# Patient Record
Sex: MALE | Race: WHITE | NOT HISPANIC OR LATINO | Employment: OTHER | URBAN - METROPOLITAN AREA
[De-identification: names, ages, dates, MRNs, and addresses within clinical notes are randomized per-mention and may not be internally consistent; named-entity substitution may affect disease eponyms.]

---

## 2024-09-09 ENCOUNTER — ANTICOAGULATION VISIT (OUTPATIENT)
Dept: FAMILY MEDICINE CLINIC | Facility: CLINIC | Age: 89
End: 2024-09-09

## 2024-09-09 ENCOUNTER — OFFICE VISIT (OUTPATIENT)
Dept: FAMILY MEDICINE CLINIC | Facility: CLINIC | Age: 89
End: 2024-09-09
Payer: MEDICARE

## 2024-09-09 VITALS
TEMPERATURE: 97.8 F | OXYGEN SATURATION: 96 % | BODY MASS INDEX: 29.35 KG/M2 | SYSTOLIC BLOOD PRESSURE: 136 MMHG | DIASTOLIC BLOOD PRESSURE: 80 MMHG | HEART RATE: 73 BPM | RESPIRATION RATE: 16 BRPM | WEIGHT: 187 LBS | HEIGHT: 67 IN

## 2024-09-09 DIAGNOSIS — Z95.1 HX OF CABG: ICD-10-CM

## 2024-09-09 DIAGNOSIS — E53.8 VITAMIN B12 DEFICIENCY: ICD-10-CM

## 2024-09-09 DIAGNOSIS — E55.9 VITAMIN D DEFICIENCY: ICD-10-CM

## 2024-09-09 DIAGNOSIS — Z12.5 SCREENING FOR PROSTATE CANCER: ICD-10-CM

## 2024-09-09 DIAGNOSIS — Z95.0 PACEMAKER: ICD-10-CM

## 2024-09-09 DIAGNOSIS — Z95.1 HX OF CABG: Primary | ICD-10-CM

## 2024-09-09 DIAGNOSIS — E78.2 MIXED HYPERLIPIDEMIA: Primary | ICD-10-CM

## 2024-09-09 LAB — INR PPP: 1.6 (ref 2–3)

## 2024-09-09 PROCEDURE — 99204 OFFICE O/P NEW MOD 45 MIN: CPT | Performed by: NURSE PRACTITIONER

## 2024-09-09 PROCEDURE — 36416 COLLJ CAPILLARY BLOOD SPEC: CPT | Performed by: NURSE PRACTITIONER

## 2024-09-09 PROCEDURE — 85610 PROTHROMBIN TIME: CPT | Performed by: NURSE PRACTITIONER

## 2024-09-09 PROCEDURE — 1159F MED LIST DOCD IN RCRD: CPT | Performed by: NURSE PRACTITIONER

## 2024-09-09 PROCEDURE — 1160F RVW MEDS BY RX/DR IN RCRD: CPT | Performed by: NURSE PRACTITIONER

## 2024-09-09 RX ORDER — LANOLIN ALCOHOL/MO/W.PET/CERES
2500 CREAM (GRAM) TOPICAL DAILY
COMMUNITY

## 2024-09-09 RX ORDER — SIMVASTATIN 40 MG
40 TABLET ORAL NIGHTLY
COMMUNITY

## 2024-09-09 RX ORDER — TAMSULOSIN HYDROCHLORIDE 0.4 MG/1
1 CAPSULE ORAL DAILY
COMMUNITY

## 2024-09-09 RX ORDER — SOTALOL HYDROCHLORIDE 80 MG/1
80 TABLET ORAL 2 TIMES DAILY
COMMUNITY

## 2024-09-09 RX ORDER — WARFARIN SODIUM 5 MG/1
5 TABLET ORAL
COMMUNITY

## 2024-09-09 RX ORDER — FUROSEMIDE 20 MG
20 TABLET ORAL 2 TIMES DAILY
COMMUNITY

## 2024-09-09 RX ORDER — ASPIRIN 81 MG/1
81 TABLET ORAL DAILY
COMMUNITY

## 2024-09-09 NOTE — PROGRESS NOTES
Subjective   Yuliana Tate is a 89 y.o. male.     History of Present Illness   Chief Complaint     Establish Care ([facility] [place] [phone])  REFERRAL FOR CARDIOLOGY ([facility] ([place]))    Yuliana Tate 89 y.o. male who presents today for a new patient appointment.    he has a history of   Patient Active Problem List   Diagnosis    Hx of CABG    Pacemaker   .  he is here to establish care I reviewed the PFSH recorded today by my MA/LPN staff.   he   He has been feeling well.    Patient recently moved here to live with his son and daughter-in-law due to his wife passing.  Patient was seeing primary care and cardiology.  Attempted to pull over records from the internal medicine and cardiology group that he saw but unable to access those records through Care Everywhere.  Having patient's sign release of information so that we can request the records via fax.  He is on warfarin which she states is because of the CABG x 3 and pacemaker.  He last had his INR checked a couple of weeks ago.  States it was normal a couple of weeks ago but had been abnormal a few days prior.  He is due for recheck.  He also needs a referral to a local cardiologist.  He does not need any medications refilled at this time.  He was taking an over-the-counter B12 supplement that his primary care told him to take but is unsure if he actually had deficiency.  He is due for labs as it has been about 6 months since he had any.  He is not fasting today.    He is unsure of the date of his last colonoscopy but states it was normal.  Denies family history of colon or prostate cancer.    The following portions of the patient's history were reviewed and updated as appropriate: allergies, current medications, past family history, past medical history, past social history, past surgical history, and problem list.    Review of Systems   Respiratory:   Negative for cough and shortness of breath.    Cardiovascular:  Negative for chest pain and palpitations.   Skin:  Negative for rash.   Psychiatric/Behavioral:  Negative for dysphoric mood and sleep disturbance. The patient is not nervous/anxious.        Objective   Physical Exam  Vitals and nursing note reviewed.   Constitutional:       Appearance: Normal appearance. He is well-developed.   Neck:      Vascular: No carotid bruit.   Cardiovascular:      Rate and Rhythm: Normal rate and regular rhythm.   Pulmonary:      Effort: Pulmonary effort is normal.      Breath sounds: Normal breath sounds.   Neurological:      Mental Status: He is alert and oriented to person, place, and time.   Psychiatric:         Mood and Affect: Mood normal.         Behavior: Behavior normal.         Thought Content: Thought content normal.         Judgment: Judgment normal.         Assessment & Plan   Diagnoses and all orders for this visit:    1. Mixed hyperlipidemia (Primary)  -     Cancel: Comprehensive metabolic panel  -     Cancel: Lipid panel  -     Cancel: CBC and Differential  -     Cancel: TSH  -     Cancel: PSA Screen  -     CBC and Differential  -     Comprehensive metabolic panel  -     Lipid panel  -     PSA Screen  -     TSH    2. Screening for prostate cancer  -     Cancel: PSA Screen  -     PSA Screen    3. Hx of CABG  -     Ambulatory Referral to Cardiology    4. Pacemaker  -     Ambulatory Referral to Cardiology    5. Vitamin B12 deficiency  -     Vitamin B12    6. Vitamin D deficiency  -     Vitamin D 25 hydroxy          Referral placed to cardiology.  Records requested.  INR checked today and was 1.6.  He takes 2.5 mg daily but missed a dose the other day.  He will take 5 mg today and then resume regular dosing. Recheck INR in 1 week.  Patient and family will schedule appointment for labs and nurse appt in 1 week for INR check.

## 2024-09-13 ENCOUNTER — PATIENT ROUNDING (BHMG ONLY) (OUTPATIENT)
Dept: FAMILY MEDICINE CLINIC | Facility: CLINIC | Age: 89
End: 2024-09-13
Payer: MEDICARE

## 2024-09-13 NOTE — PROGRESS NOTES
A My-Chart message has been sent to the patient for PATIENT ROUNDING with [facility]

## 2024-09-17 ENCOUNTER — ANTICOAGULATION VISIT (OUTPATIENT)
Dept: FAMILY MEDICINE CLINIC | Facility: CLINIC | Age: 89
End: 2024-09-17
Payer: MEDICARE

## 2024-09-17 DIAGNOSIS — Z95.1 HX OF CABG: Primary | ICD-10-CM

## 2024-09-17 LAB — INR PPP: 2.4 (ref 2–3)

## 2024-09-17 PROCEDURE — 85610 PROTHROMBIN TIME: CPT | Performed by: NURSE PRACTITIONER

## 2024-09-17 PROCEDURE — 36416 COLLJ CAPILLARY BLOOD SPEC: CPT | Performed by: NURSE PRACTITIONER

## 2024-09-18 LAB
25(OH)D3+25(OH)D2 SERPL-MCNC: 38.2 NG/ML (ref 30–100)
ALBUMIN SERPL-MCNC: 4 G/DL (ref 3.5–5.2)
ALBUMIN/GLOB SERPL: 1.9 G/DL
ALP SERPL-CCNC: 74 U/L (ref 39–117)
ALT SERPL-CCNC: 9 U/L (ref 1–41)
AST SERPL-CCNC: 14 U/L (ref 1–40)
BASOPHILS # BLD AUTO: 0.04 10*3/MM3 (ref 0–0.2)
BASOPHILS NFR BLD AUTO: 0.8 % (ref 0–1.5)
BILIRUB SERPL-MCNC: 0.7 MG/DL (ref 0–1.2)
BUN SERPL-MCNC: 10 MG/DL (ref 8–23)
BUN/CREAT SERPL: 8.1 (ref 7–25)
CALCIUM SERPL-MCNC: 9.4 MG/DL (ref 8.6–10.5)
CHLORIDE SERPL-SCNC: 105 MMOL/L (ref 98–107)
CHOLEST SERPL-MCNC: 144 MG/DL (ref 0–200)
CO2 SERPL-SCNC: 30.5 MMOL/L (ref 22–29)
CREAT SERPL-MCNC: 1.24 MG/DL (ref 0.76–1.27)
EGFRCR SERPLBLD CKD-EPI 2021: 55.9 ML/MIN/1.73
EOSINOPHIL # BLD AUTO: 0.15 10*3/MM3 (ref 0–0.4)
EOSINOPHIL NFR BLD AUTO: 2.9 % (ref 0.3–6.2)
ERYTHROCYTE [DISTWIDTH] IN BLOOD BY AUTOMATED COUNT: 12.6 % (ref 12.3–15.4)
GLOBULIN SER CALC-MCNC: 2.1 GM/DL
GLUCOSE SERPL-MCNC: 97 MG/DL (ref 65–99)
HCT VFR BLD AUTO: 40.1 % (ref 37.5–51)
HDLC SERPL-MCNC: 45 MG/DL (ref 40–60)
HGB BLD-MCNC: 13.2 G/DL (ref 13–17.7)
IMM GRANULOCYTES # BLD AUTO: 0.01 10*3/MM3 (ref 0–0.05)
IMM GRANULOCYTES NFR BLD AUTO: 0.2 % (ref 0–0.5)
LDLC SERPL CALC-MCNC: 83 MG/DL (ref 0–100)
LYMPHOCYTES # BLD AUTO: 1.31 10*3/MM3 (ref 0.7–3.1)
LYMPHOCYTES NFR BLD AUTO: 25 % (ref 19.6–45.3)
MCH RBC QN AUTO: 29.9 PG (ref 26.6–33)
MCHC RBC AUTO-ENTMCNC: 32.9 G/DL (ref 31.5–35.7)
MCV RBC AUTO: 90.7 FL (ref 79–97)
MONOCYTES # BLD AUTO: 0.37 10*3/MM3 (ref 0.1–0.9)
MONOCYTES NFR BLD AUTO: 7.1 % (ref 5–12)
NEUTROPHILS # BLD AUTO: 3.35 10*3/MM3 (ref 1.7–7)
NEUTROPHILS NFR BLD AUTO: 64 % (ref 42.7–76)
NRBC BLD AUTO-RTO: 0 /100 WBC (ref 0–0.2)
PLATELET # BLD AUTO: 149 10*3/MM3 (ref 140–450)
POTASSIUM SERPL-SCNC: 4.6 MMOL/L (ref 3.5–5.2)
PROT SERPL-MCNC: 6.1 G/DL (ref 6–8.5)
PSA SERPL-MCNC: 0.91 NG/ML (ref 0–4)
RBC # BLD AUTO: 4.42 10*6/MM3 (ref 4.14–5.8)
SODIUM SERPL-SCNC: 142 MMOL/L (ref 136–145)
TRIGL SERPL-MCNC: 86 MG/DL (ref 0–150)
TSH SERPL DL<=0.005 MIU/L-ACNC: 2.74 UIU/ML (ref 0.27–4.2)
VIT B12 SERPL-MCNC: >2000 PG/ML (ref 211–946)
VLDLC SERPL CALC-MCNC: 16 MG/DL (ref 5–40)
WBC # BLD AUTO: 5.23 10*3/MM3 (ref 3.4–10.8)

## 2024-09-30 ENCOUNTER — TELEPHONE (OUTPATIENT)
Dept: CARDIOLOGY | Facility: CLINIC | Age: 89
End: 2024-09-30
Payer: MEDICARE

## 2024-09-30 ENCOUNTER — TELEPHONE (OUTPATIENT)
Dept: PHARMACY | Facility: HOSPITAL | Age: 89
End: 2024-09-30
Payer: MEDICARE

## 2024-09-30 ENCOUNTER — ANTICOAGULATION VISIT (OUTPATIENT)
Dept: PHARMACY | Facility: HOSPITAL | Age: 89
End: 2024-09-30
Payer: MEDICARE

## 2024-09-30 DIAGNOSIS — I48.91 ATRIAL FIBRILLATION, UNSPECIFIED TYPE: Primary | ICD-10-CM

## 2024-09-30 NOTE — PROGRESS NOTES
This visit is for documentation purposes only: Current anticoagulation episode resolved to allow for new referral by Cardiology to Medication Management Clinic.

## 2024-09-30 NOTE — TELEPHONE ENCOUNTER
Received referral for warfarin management. Spoke with Mr. Tate's son, Curly. He is agreeable to INR check in clinic on 10/8/24 (same day as his appointment with Dr. Alexander). Per son's report, Mr. Tate has been prescribed warfarin for ~20 years. He recently moved here from [place] to be near his son. Unfortunately, his wife passed away in May of this year.

## 2024-09-30 NOTE — TELEPHONE ENCOUNTER
Good morning,     I have resolved the existing anticoagulation episode on file to allow for a new anticoagulation referral from Dr. Alexander. As soon as we receive Dr. Alexander' referral, we will be happy to contact Mr. Tate and schedule him for his first visit with our clinic for warfarin monitoring/management.     Please call us if you have any questions:  [phone].     Thank you!  Charanjit

## 2024-09-30 NOTE — TELEPHONE ENCOUNTER
Caller: LISA    Relationship: SON    Best call back number: [phone]        What was the call regarding:  LISA WAS CALLING IN TO ASK WHAT THE PROCESS WAS  FOR THE MEDICATION MANAGEMENT CLINIC FOR PT'S INR- THEY ARE CURRENTLY HAVING IT MANAGED IN [facility] BUT WOULD LIKE TO SWITCH OVER TO THE HOSPITAL    ALSO MEDICAL RECS MAYBE AT:     [facility]     DR. KNOX    [phone]  FAX: [phone]    ANY ANTOINETTE FORMS CAN BE SENT TO :    [email or web address]

## 2024-10-08 ENCOUNTER — OFFICE VISIT (OUTPATIENT)
Dept: CARDIOLOGY | Facility: CLINIC | Age: 89
End: 2024-10-08
Payer: MEDICARE

## 2024-10-08 ENCOUNTER — ANTICOAGULATION VISIT (OUTPATIENT)
Dept: PHARMACY | Facility: HOSPITAL | Age: 89
End: 2024-10-08
Payer: MEDICARE

## 2024-10-08 VITALS
HEART RATE: 80 BPM | OXYGEN SATURATION: 95 % | DIASTOLIC BLOOD PRESSURE: 80 MMHG | SYSTOLIC BLOOD PRESSURE: 130 MMHG | HEIGHT: 67 IN | WEIGHT: 184.8 LBS | BODY MASS INDEX: 29 KG/M2

## 2024-10-08 DIAGNOSIS — Z95.1 HX OF CABG: ICD-10-CM

## 2024-10-08 DIAGNOSIS — I49.5 SSS (SICK SINUS SYNDROME): ICD-10-CM

## 2024-10-08 DIAGNOSIS — I48.91 ATRIAL FIBRILLATION, UNSPECIFIED TYPE: Primary | ICD-10-CM

## 2024-10-08 DIAGNOSIS — E78.2 MIXED HYPERLIPIDEMIA: ICD-10-CM

## 2024-10-08 DIAGNOSIS — I25.810 CORONARY ARTERY DISEASE INVOLVING CORONARY BYPASS GRAFT OF NATIVE HEART WITHOUT ANGINA PECTORIS: Primary | ICD-10-CM

## 2024-10-08 DIAGNOSIS — Z95.0 PACEMAKER: ICD-10-CM

## 2024-10-08 DIAGNOSIS — I34.0 NONRHEUMATIC MITRAL VALVE REGURGITATION: ICD-10-CM

## 2024-10-08 DIAGNOSIS — I48.0 PAF (PAROXYSMAL ATRIAL FIBRILLATION): ICD-10-CM

## 2024-10-08 LAB
INR PPP: 2.1 (ref 0.91–1.09)
PROTHROMBIN TIME: 25.1 SECONDS (ref 10–13.8)

## 2024-10-08 PROCEDURE — 85610 PROTHROMBIN TIME: CPT

## 2024-10-08 PROCEDURE — 99204 OFFICE O/P NEW MOD 45 MIN: CPT | Performed by: INTERNAL MEDICINE

## 2024-10-08 PROCEDURE — 36416 COLLJ CAPILLARY BLOOD SPEC: CPT

## 2024-10-08 PROCEDURE — 93000 ELECTROCARDIOGRAM COMPLETE: CPT | Performed by: INTERNAL MEDICINE

## 2024-10-08 PROCEDURE — G0463 HOSPITAL OUTPT CLINIC VISIT: HCPCS

## 2024-10-08 NOTE — PROGRESS NOTES
Subjective:     Encounter Date:10/08/24      Patient ID: DAVE Tate is a 89 y.o. male.    Chief Complaint:  History of Present Illness    Dear Sabrina,    I had the pleasure of seeing this patient in the office today for initial evaluation and consultation.  I appreciate that you sent him in to see us.  They come in today to be seen for history of coronary disease, prior bypass, sick sinus syndrome, status post pacemaker placement, paroxysmal atrial fibrillation on chronic anticoagulation with warfarin, and mixed hyperlipidemia.    Patient previously lived in [place] and is now moved to [place] to be with his family.  His wife passed away at the start of 2024.    Patient has a history of CAD, status post coronary bypass graft in 2008.  He has a history of sick sinus syndrome with paroxysmal atrial fibrillation and he has a pacemaker in place.  He has been on chronic anticoagulation with warfarin, also on antiarrhythmic therapy with sotalol.  He has a history of hypertension and hyperlipidemia.    He denies any particular cardiac complaints.  No chest pain or chest discomfort.  No shortness of breath.  No lower extremity edema.  Denies any orthopnea or PND.    2008, April 2008, he had a coronary bypass grafting with sequential mammary to the diagonal and LAD, and also an SVG to the obtuse marginal.  He had a cardiac catheterization performed in 2011 that showed no significant disease per their report.  His pacemaker placed with an A-fib ablation in 2008 and his most recent generator change was April 2017.    He remains on antiarrhythmic therapy for his atrial fibrillation and he has not felt any breakthrough A-fib.    Most recent stress test appears to been performed December 2022.  Patient had a normal pharmacologic stress test with myocardial perfusion imaging with no evidence of ischemia and ejection fraction 72%.  He had an echocardiogram performed at the same time, ejection fraction on that was 56% with  "grade 1 diastolic dysfunction mild to moderate mitral regurgitation no other significant abnormality.    The following portions of the patient's history were reviewed and updated as appropriate: allergies, current medications, past family history, past medical history, past social history, past surgical history and problem list.      ECG 12 Lead    Date/Time: 10/8/2024 12:35 PM  Performed by: Juan Francisco Alexander III, MD    Authorized by: Juan Francisco Alexander III, MD  Comparison: compared with previous ECG   Similar to previous ECG  Rhythm: sinus rhythm  Rate: normal  Conduction: conduction normal  ST Segments: ST segments normal  T Waves: T waves normal  QRS axis: normal  Other: no other findings    Clinical impression: normal ECG             Objective:     Vitals:    10/08/24 0933   BP: 130/80   BP Location: Left arm   Patient Position: Sitting   Pulse: 80   SpO2: 95%   Weight: 83.8 kg (184 lb 12.8 oz)   Height: 170.2 cm (67\")     Body mass index is 28.94 kg/m².      Vitals reviewed.   Constitutional:       General: Not in acute distress.     Appearance: Well-developed. Not diaphoretic.   Eyes:      General:         Right eye: No discharge.         Left eye: No discharge.      Conjunctiva/sclera: Conjunctivae normal.   HENT:      Head: Normocephalic and atraumatic.      Nose: Nose normal.   Neck:      Thyroid: No thyromegaly.      Trachea: No tracheal deviation.   Pulmonary:      Effort: Pulmonary effort is normal. No respiratory distress.      Breath sounds: Normal breath sounds. No stridor.   Chest:      Chest wall: Not tender to palpatation.   Cardiovascular:      Normal rate. Regular rhythm.      Murmurs: There is a grade 1/6 high frequency blowing holosystolic murmur at the apex.      . No S3 gallop. No click. No rub.   Pulses:     Intact distal pulses.   Edema:     Peripheral edema absent.   Abdominal:      General: Bowel sounds are normal. There is no distension.      Palpations: Abdomen is soft. There is no abdominal " "mass.   Musculoskeletal: Normal range of motion.         General: No tenderness or deformity.      Cervical back: Normal range of motion and neck supple. Skin:     General: Skin is warm and dry.      Findings: No erythema or rash.   Neurological:      Mental Status: Alert.   Psychiatric:         Attention and Perception: Attention normal.         Data and records reviewed:     Lab Results   Component Value Date    GLUCOSE 97 09/17/2024    BUN 10 09/17/2024    CREATININE 1.24 09/17/2024     09/17/2024    K 4.6 09/17/2024     09/17/2024    CALCIUM 9.4 09/17/2024    ALBUMIN 4.0 09/17/2024    ALT 9 09/17/2024    AST 14 09/17/2024    ALKPHOS 74 09/17/2024    BILITOT 0.7 09/17/2024    BCR 8.1 09/17/2024     No results found for: \"CHOL\"  Lab Results   Component Value Date    TRIG 86 09/17/2024     Lab Results   Component Value Date    HDL 45 09/17/2024     Lab Results   Component Value Date    LDL 83 09/17/2024     Lab Results   Component Value Date    VLDL 16 09/17/2024     No results found for: \"LDLHDL\"  CBC          9/17/2024    10:29   CBC   WBC 5.23    RBC 4.42    Hemoglobin 13.2    Hematocrit 40.1    MCV 90.7    MCH 29.9    MCHC 32.9    RDW 12.6    Platelets 149      No radiology results for the last 90 days.          Assessment:          Diagnosis Plan   1. Coronary artery disease involving coronary bypass graft of native heart without angina pectoris  ECG 12 Lead      2. Hx of CABG  ECG 12 Lead      3. PAF (paroxysmal atrial fibrillation)  ECG 12 Lead      4. Pacemaker  ECG 12 Lead      5. SSS (sick sinus syndrome)  ECG 12 Lead      6. Mixed hyperlipidemia  ECG 12 Lead      7. Nonrheumatic mitral valve regurgitation  ECG 12 Lead             Plan:       1.  CAD, status post CABG x 3 as outlined above, no angina pectoris, continue current medical therapy  2.  Paroxysmal atrial fibrillation in sinus rhythm, remains on sotalol 80 mg twice daily  3.  Chronic anticoagulation on warfarin, he says they talk to " him previously but NOAC therapy but he just does not want to change, I reviewed that again with he and his son today  4.  Sick sinus syndrome, status post pacemaker in place, looks like the device was replaced in 2017.  I do not yet have that procedure note.  We will get him enrolled in our device clinic  5.  Hypertension, excellent control, continue same  6.  Mixed hyperlipidemia on lipid-lowering therapy with simvastatin, continue same.  7.  Mitral regurgitation-mild to moderate on most recent echocardiogram in 2022, continue to follow.    Thank you very much for allowing us to participate in the care of this pleasant patient.  Please don't hesitate to call if I can be of assistance in any way.      Current Outpatient Medications:     aspirin 81 MG EC tablet, Take 1 tablet by mouth Daily., Disp: , Rfl:     furosemide (LASIX) 20 MG tablet, Take 1 tablet by mouth 2 (Two) Times a Day., Disp: , Rfl:     simvastatin (ZOCOR) 40 MG tablet, Take 1 tablet by mouth Every Night., Disp: , Rfl:     sotalol (BETAPACE) 80 MG tablet, Take 1 tablet by mouth 2 (Two) Times a Day., Disp: , Rfl:     tamsulosin (FLOMAX) 0.4 MG capsule 24 hr capsule, Take 1 capsule by mouth Daily., Disp: , Rfl:     warfarin (COUMADIN) 5 MG tablet, Take 1 tablet by mouth Daily. HAS 5MG ON HAND AND CUTS IN 1/2 DUE TO COST LAST INR WAS 2.4 AND WAS REDUCED TO 2.5, Disp: , Rfl:     vitamin B-12 (CYANOCOBALAMIN) 1000 MCG tablet, Take 2.5 tablets by mouth Daily. (Patient not taking: Reported on 10/8/2024), Disp: , Rfl:          Return in about 1 year (around 10/8/2025).

## 2024-10-08 NOTE — PROGRESS NOTES
Anticoagulation Clinic Progress Note  Anticoagulation Summary  As of 10/8/2024      INR goal:  2.0-3.0   TTR:  --   INR used for dosin.1 (10/8/2024)   Warfarin maintenance plan:  2.5 mg every day   Weekly warfarin total:  17.5 mg   Plan last modified:  Charanjit Horowitz, PharmD (10/8/2024)   Next INR check:  10/23/2024   Target end date:      Indications    Atrial fibrillation  unspecified type [I48.91]                 Anticoagulation Episode Summary       INR check location:      Preferred lab:      Send INR reminders to:   [facility] [place]    Comments:  1st visit 10/8/24          Anticoagulation Care Providers       Provider Role Specialty Phone number    Juan Francisco Alexander III, MD Referring Cardiology [phone]            Clinic Interview:  Patient Findings     Positives:  Missed doses, Other complaints    Negatives:  Signs/symptoms of thrombosis, Signs/symptoms of bleeding,   Laboratory test error suspected, Change in health, Change in alcohol use,   Change in activity, Upcoming invasive procedure, Emergency department   visit, Upcoming dental procedure, Extra doses, Change in medications,   Change in diet/appetite, Hospital admission, Bruising    Comments:  Reports he has been prescribed warfarin ~20 years. He reports   having 5-mg tablets, and he reports dose of 2.5 mg daily for quite some   time. Although, he indicates he recalls his INR occasionally becoming   supratherapeutic, so he has occasionally held his warfarin a day to try to   correct it himself. He held his warfarin yesterday because he was worried   his INR would be supratherapeutic. He reports history of hemorrhoids,   resulting in occasional hemorrhoidal bleeding.      Clinical Outcomes     Negatives:  Major bleeding event, Thromboembolic event,   Anticoagulation-related hospital admission, Anticoagulation-related ED   visit, Anticoagulation-related fatality    Comments:  Reports he has been prescribed warfarin ~20 years. He reports    having 5-mg tablets, and he reports dose of 2.5 mg daily for quite some   time. Although, he indicates he recalls his INR occasionally becoming   supratherapeutic, so he has occasionally held his warfarin a day to try to   correct it himself. He held his warfarin yesterday because he was worried   his INR would be supratherapeutic. He reports history of hemorrhoids,   resulting in occasional hemorrhoidal bleeding.        Education:  Yuliana Tate is a new start in the Medication Management Clinic. We discussed the followin) Warfarin's indication, mechanism, and dosing  2) Enforced the importance of taking warfarin as instructed and at the same time every day, preferably in the evening so that we can make dose adjustments more easily following subsequent clinic visits  3) What he should do about a missed dose; pts can take missed doses within about 12 hours of their usual scheduled dose, but he was instructed on the importance of not doubling up on doses unless told to do so by the Medication Management Clinic  4) Explained possible side effects of warfarin therapy, including increased risk of bleeding, s/sx of bleeding and s/sx of any additional clots/PE/CVA.   5) Discussed monitoring of warfarin, the INR, goal INR range, and the frequency of monitoring  6) Reviewed drug/food/tobacco/EtOH interactions and provided written information covering these topics in more detail, explaining that green, leafy vegetables interact most heavily with warfarin  7) Instructed the pt not to take or discontinue any medications without informing his physician/pharmacist and reminded him to inform us of any dietary changes, as well  8) Explained that he would be coming into the clinic more frequently in these first few weeks of therapy as we try to adjust his dose and achieve a therapeutic INR x 2 consecutive readings. Once that is achieved, patient will follow up in clinic every 4 weeks, on average.    He stated no problems  with transportation or scheduling clinic appts in this manner. he expressed understanding of the information provided and has no additional questions at this time.    Yuliana Tate was presented with a copy of the Patients Rights and Responsibilities. he expressed verbal consent and agreement to receive care in the Medication Management Clinic under the current collaborative care agreement with [facility].       INR History:      9/9/2024    11:38 AM 9/17/2024    10:00 AM 9/30/2024    11:08 AM 10/8/2024     8:30 AM   Anticoagulation Monitoring   INR 1.60 2.40  2.1   INR Date 9/9/2024 9/17/2024  10/8/2024   INR Goal 2.0-3.0 2.0-3.0 2.0-3.0 2.0-3.0   Trend  Same     Last Week Total 0 mg 35 mg  15 mg   Next Week Total 35 mg 35 mg  17.5 mg   Sun 5 mg 5 mg  2.5 mg   Mon 5 mg 5 mg  2.5 mg   Tue 5 mg 5 mg  2.5 mg   Wed 5 mg 5 mg  2.5 mg   Thu 5 mg 5 mg  2.5 mg   Fri 5 mg 5 mg  2.5 mg   Sat 5 mg 5 mg  2.5 mg   Visit Report Report          Plan:  1. INR is Therapeutic today- see above in Anticoagulation Summary.   Will instruct Yuliana Tate to Continue their warfarin regimen at dose of 2.5 mg daily - see above in Anticoagulation Summary.  2. Follow up in 2 weeks to ensure stable.   3. Patient declines warfarin refills.  4. Verbal and written information provided. Patient expresses understanding and has no further questions at this time.    Charanjit Horowitz, PharmD

## 2024-10-14 ENCOUNTER — TELEPHONE (OUTPATIENT)
Dept: CARDIOLOGY | Facility: CLINIC | Age: 89
End: 2024-10-14
Payer: MEDICARE

## 2024-10-14 NOTE — TELEPHONE ENCOUNTER
Caller: michelle hancock    Relationship: Emergency Contact    Best call back number: [phone]    What is the medical concern/diagnosis: HAS PACEMAKER    What specialty or service is being requested: ELECTROPHISOLOGY    What is the provider, practice or medical service name: [facility]    What is the office location: [address] [place] [address] 4TH FLOOR    What is the office phone number:     Any additional details: PT'S SON WAS TOLD THAT A REFERRAL TO SEE AN EP WAS PUT IN FOR HIS DAD. NO REFERRAL IN CHART. PLEASE CALL TO SCHEDULE APPT.

## 2024-10-24 ENCOUNTER — ANTICOAGULATION VISIT (OUTPATIENT)
Dept: PHARMACY | Facility: HOSPITAL | Age: 89
End: 2024-10-24
Payer: MEDICARE

## 2024-10-24 DIAGNOSIS — I48.0 PAF (PAROXYSMAL ATRIAL FIBRILLATION): Primary | ICD-10-CM

## 2024-10-24 LAB
INR PPP: 2.6 (ref 0.91–1.09)
PROTHROMBIN TIME: 31.5 SECONDS (ref 10–13.8)

## 2024-10-24 PROCEDURE — 36416 COLLJ CAPILLARY BLOOD SPEC: CPT

## 2024-10-24 PROCEDURE — G0463 HOSPITAL OUTPT CLINIC VISIT: HCPCS

## 2024-10-24 PROCEDURE — 85610 PROTHROMBIN TIME: CPT

## 2024-10-24 NOTE — PROGRESS NOTES
Anticoagulation Clinic Progress Note    Anticoagulation Summary  As of 10/24/2024      INR goal:  2.0-3.0   TTR:  100.0% (2 wk)   INR used for dosin.6 (10/24/2024)   Warfarin maintenance plan:  2.5 mg every day   Weekly warfarin total:  17.5 mg   No change documented:  Charanjit Horowitz PharmD   Plan last modified:  Charanjit Horowitz PharmD (10/8/2024)   Next INR check:  2024   Target end date:  --    Indications    PAF (paroxysmal atrial fibrillation) [I48.0]                 Anticoagulation Episode Summary       INR check location:  --    Preferred lab:  --    Send INR reminders to:   [facility]    Comments:  1st visit 10/8/24          Anticoagulation Care Providers       Provider Role Specialty Phone number    Juan Francisco Alexander III, MD Referring Cardiology [phone]            Clinic Interview:  Patient Findings     Negatives:  Signs/symptoms of thrombosis, Signs/symptoms of bleeding,   Laboratory test error suspected, Change in health, Change in alcohol use,   Change in activity, Upcoming invasive procedure, Emergency department   visit, Upcoming dental procedure, Missed doses, Extra doses, Change in   medications, Change in diet/appetite, Hospital admission, Bruising, Other   complaints      Clinical Outcomes     Negatives:  Major bleeding event, Thromboembolic event,   Anticoagulation-related hospital admission, Anticoagulation-related ED   visit, Anticoagulation-related fatality        INR History:      2024    11:38 AM 2024    10:00 AM 2024    11:08 AM 10/8/2024     8:30 AM 10/24/2024     2:00 PM   Anticoagulation Monitoring   INR 1.60 2.40 -- 2.1 2.6   INR Date 2024 2024  10/8/2024 10/24/2024   INR Goal 2.0-3.0 2.0-3.0 2.0-3.0 2.0-3.0 2.0-3.0   Trend  Same   Same   Last Week Total 0 mg 35 mg  15 mg 17.5 mg   Next Week Total 35 mg 35 mg  17.5 mg 17.5 mg   Sun 5 mg 5 mg  2.5 mg 2.5 mg   Mon 5 mg 5 mg  2.5 mg 2.5 mg   Tue 5 mg 5 mg  2.5 mg 2.5 mg   Wed 5 mg 5 mg  2.5  mg 2.5 mg   Thu 5 mg 5 mg  2.5 mg 2.5 mg   Fri 5 mg 5 mg  2.5 mg 2.5 mg   Sat 5 mg 5 mg  2.5 mg 2.5 mg   Visit Report Report   Report        Plan:  1. INR is Therapeutic today- see above in Anticoagulation Summary.  Will instruct DAVE Tate to Continue their warfarin regimen- see above in Anticoagulation Summary.  2. Follow up in 4 weeks  3. Patient declines warfarin refills.  4. Verbal and written information provided. Patient expresses understanding and has no further questions at this time.    Charanjit Horowitz, PharmD

## 2024-10-30 ENCOUNTER — CLINICAL SUPPORT NO REQUIREMENTS (OUTPATIENT)
Age: 89
End: 2024-10-30
Payer: MEDICARE

## 2024-10-30 DIAGNOSIS — I49.5 SSS (SICK SINUS SYNDROME): Primary | ICD-10-CM

## 2024-10-30 PROCEDURE — 93280 PM DEVICE PROGR EVAL DUAL: CPT | Performed by: STUDENT IN AN ORGANIZED HEALTH CARE EDUCATION/TRAINING PROGRAM

## 2024-11-12 RX ORDER — FUROSEMIDE 20 MG/1
20 TABLET ORAL 2 TIMES DAILY
Qty: 180 TABLET | Refills: 0 | Status: SHIPPED | OUTPATIENT
Start: 2024-11-12

## 2024-11-21 ENCOUNTER — ANTICOAGULATION VISIT (OUTPATIENT)
Dept: PHARMACY | Facility: HOSPITAL | Age: 89
End: 2024-11-21
Payer: MEDICARE

## 2024-11-21 DIAGNOSIS — I48.0 PAF (PAROXYSMAL ATRIAL FIBRILLATION): Primary | ICD-10-CM

## 2024-11-21 LAB
INR PPP: 3.3 (ref 0.91–1.09)
PROTHROMBIN TIME: 40.1 SECONDS (ref 10–13.8)

## 2024-11-21 PROCEDURE — G0463 HOSPITAL OUTPT CLINIC VISIT: HCPCS

## 2024-11-21 PROCEDURE — 36416 COLLJ CAPILLARY BLOOD SPEC: CPT

## 2024-11-21 PROCEDURE — 85610 PROTHROMBIN TIME: CPT

## 2024-11-21 NOTE — PROGRESS NOTES
Anticoagulation Clinic Progress Note    Anticoagulation Summary  As of 11/21/2024      INR goal:  2.0-3.0   TTR:  71.8% (1.4 mo)   INR used for dosing:  3.3 (11/21/2024)   Warfarin maintenance plan:  2.5 mg every day   Weekly warfarin total:  17.5 mg   Plan last modified:  Charanjit Horowitz, PharmD (10/8/2024)   Next INR check:  12/6/2024   Target end date:  --    Indications    PAF (paroxysmal atrial fibrillation) [I48.0]                 Anticoagulation Episode Summary       INR check location:  --    Preferred lab:  --    Send INR reminders to:   [facility]    Comments:  1st visit 10/8/24          Anticoagulation Care Providers       Provider Role Specialty Phone number    Juan Francisco Alexander III, MD Referring Cardiology [phone]            Clinic Interview:  Patient Findings     Negatives:  Signs/symptoms of thrombosis, Signs/symptoms of bleeding,   Laboratory test error suspected, Change in health, Change in alcohol use,   Change in activity, Upcoming invasive procedure, Emergency department   visit, Upcoming dental procedure, Missed doses, Extra doses, Change in   medications, Change in diet/appetite, Hospital admission, Bruising, Other   complaints      Clinical Outcomes     Negatives:  Major bleeding event, Thromboembolic event,   Anticoagulation-related hospital admission, Anticoagulation-related ED   visit, Anticoagulation-related fatality        INR History:      9/9/2024    11:38 AM 9/17/2024    10:00 AM 9/30/2024    11:08 AM 10/8/2024     8:30 AM 10/24/2024     2:00 PM 11/21/2024     2:00 PM   Anticoagulation Monitoring   INR 1.60 2.40 -- 2.1 2.6 3.3   INR Date 9/9/2024 9/17/2024  10/8/2024 10/24/2024 11/21/2024   INR Goal 2.0-3.0 2.0-3.0 2.0-3.0 2.0-3.0 2.0-3.0 2.0-3.0   Trend  Same   Same Same   Last Week Total 0 mg 35 mg  15 mg 17.5 mg 17.5 mg   Next Week Total 35 mg 35 mg  17.5 mg 17.5 mg 15 mg   Sun 5 mg 5 mg  2.5 mg 2.5 mg 2.5 mg   Mon 5 mg 5 mg  2.5 mg 2.5 mg 2.5 mg   Tue 5 mg 5 mg   2.5 mg 2.5 mg 2.5 mg   Wed 5 mg 5 mg  2.5 mg 2.5 mg 2.5 mg   Thu 5 mg 5 mg  2.5 mg 2.5 mg Hold (11/21); Otherwise 2.5 mg   Fri 5 mg 5 mg  2.5 mg 2.5 mg 2.5 mg   Sat 5 mg 5 mg  2.5 mg 2.5 mg 2.5 mg   Visit Report Report   Report         Plan:  1. INR is Supratherapeutic today- see above in Anticoagulation Summary.  Will instruct Luciano Tate to Change their warfarin regimen (HOLD today, then resume 2.5 mg daily) - see above in Anticoagulation Summary.  2. Follow up in 2 weeks  3. Patient declines warfarin refills.  4. Verbal and written information provided. Patient expresses understanding and has no further questions at this time.    Charanjit Horowitz, PharmD

## 2024-12-06 ENCOUNTER — ANTICOAGULATION VISIT (OUTPATIENT)
Dept: PHARMACY | Facility: HOSPITAL | Age: 89
End: 2024-12-06
Payer: MEDICARE

## 2024-12-06 DIAGNOSIS — I48.0 PAF (PAROXYSMAL ATRIAL FIBRILLATION): Primary | ICD-10-CM

## 2024-12-06 LAB
INR PPP: 1.9 (ref 0.91–1.09)
PROTHROMBIN TIME: 23.3 SECONDS (ref 10–13.8)

## 2024-12-06 PROCEDURE — 36416 COLLJ CAPILLARY BLOOD SPEC: CPT

## 2024-12-06 PROCEDURE — 85610 PROTHROMBIN TIME: CPT

## 2024-12-06 PROCEDURE — G0463 HOSPITAL OUTPT CLINIC VISIT: HCPCS

## 2024-12-06 RX ORDER — TAMSULOSIN HYDROCHLORIDE 0.4 MG/1
1 CAPSULE ORAL DAILY
Qty: 30 CAPSULE | Refills: 0 | Status: SHIPPED | OUTPATIENT
Start: 2024-12-06

## 2024-12-06 NOTE — PROGRESS NOTES
Anticoagulation Clinic Progress Note    Anticoagulation Summary  As of 2024      INR goal:  2.0-3.0   TTR:  71.7% (1.9 mo)   INR used for dosin.9 (2024)   Warfarin maintenance plan:  2.5 mg every day   Weekly warfarin total:  17.5 mg   No change documented:  Miguelina Johns, PharmD   Plan last modified:  Charanjit Horowitz PharmD (10/8/2024)   Next INR check:  2024   Target end date:  --    Indications    PAF (paroxysmal atrial fibrillation) [I48.0]                 Anticoagulation Episode Summary       INR check location:  --    Preferred lab:  --    [facility] [place]    Comments:  1st visit 10/8/24          Anticoagulation Care Providers       Provider Role Specialty Phone number    Juan Francisco Alexander III, MD Referring Cardiology [phone]            Clinic Interview:  Patient Findings     Negatives:  Signs/symptoms of thrombosis, Signs/symptoms of bleeding,   Laboratory test error suspected, Change in health, Change in alcohol use,   Change in activity, Upcoming invasive procedure, Emergency department   visit, Upcoming dental procedure, Missed doses, Extra doses, Change in   medications, Change in diet/appetite, Hospital admission, Bruising, Other   complaints      Clinical Outcomes     Negatives:  Major bleeding event, Thromboembolic event,   Anticoagulation-related hospital admission, Anticoagulation-related ED   visit, Anticoagulation-related fatality        INR History:      2024    11:38 AM 2024    10:00 AM 2024    11:08 AM 10/8/2024     8:30 AM 10/24/2024     2:00 PM 2024     2:00 PM 2024     1:30 PM   Anticoagulation Monitoring   INR 1.60 2.40 -- 2.1 2.6 3.3 1.9   INR Date 2024 2024  10/8/2024 10/24/2024 2024 2024   INR Goal 2.0-3.0 2.0-3.0 2.0-3.0 2.0-3.0 2.0-3.0 2.0-3.0 2.0-3.0   Trend  Same   Same Same Same   Last Week Total 0 mg 35 mg  15 mg 17.5 mg 17.5 mg 17.5 mg   Next Week Total 35 mg 35 mg  17.5 mg 17.5  mg 15 mg 17.5 mg   Sun 5 mg 5 mg  2.5 mg 2.5 mg 2.5 mg 2.5 mg   Mon 5 mg 5 mg  2.5 mg 2.5 mg 2.5 mg 2.5 mg   Tue 5 mg 5 mg  2.5 mg 2.5 mg 2.5 mg 2.5 mg   Wed 5 mg 5 mg  2.5 mg 2.5 mg 2.5 mg 2.5 mg   Thu 5 mg 5 mg  2.5 mg 2.5 mg Hold (11/21); Otherwise 2.5 mg 2.5 mg   Fri 5 mg 5 mg  2.5 mg 2.5 mg 2.5 mg 2.5 mg   Sat 5 mg 5 mg  2.5 mg 2.5 mg 2.5 mg 2.5 mg   Visit Report Report   Report          Plan:  1. INR is Therapeutic today- see above in Anticoagulation Summary.  Will instruct Luciano Tate to Continue their warfarin regimen- see above in Anticoagulation Summary.  2. Follow up in 2 weeks  3. Patient declines warfarin refills.  4. Verbal and written information provided. Patient expresses understanding and has no further questions at this time.    Miguelina Johns, PharmD

## 2024-12-10 ENCOUNTER — TELEPHONE (OUTPATIENT)
Dept: CARDIOLOGY | Facility: CLINIC | Age: 89
End: 2024-12-10
Payer: MEDICARE

## 2024-12-10 NOTE — TELEPHONE ENCOUNTER
Called and spoke with patients son to get him scheduled to get his blood pressure checked and comparing it to his blood pressure cuff that he has at home.    Patient states he has been experiencing low blood pressure for roughly a month. He has been experiencing some occ. dizziness.    Explained that I will send a message over to Benjamin to make sure he is aware of this.

## 2024-12-20 ENCOUNTER — OFFICE VISIT (OUTPATIENT)
Dept: CARDIOLOGY | Facility: CLINIC | Age: 89
End: 2024-12-20
Payer: MEDICARE

## 2024-12-20 VITALS
HEIGHT: 67 IN | WEIGHT: 183.6 LBS | OXYGEN SATURATION: 98 % | BODY MASS INDEX: 28.82 KG/M2 | HEART RATE: 69 BPM | DIASTOLIC BLOOD PRESSURE: 78 MMHG | SYSTOLIC BLOOD PRESSURE: 140 MMHG

## 2024-12-20 DIAGNOSIS — E78.2 MIXED HYPERLIPIDEMIA: ICD-10-CM

## 2024-12-20 DIAGNOSIS — I48.0 PAF (PAROXYSMAL ATRIAL FIBRILLATION): ICD-10-CM

## 2024-12-20 DIAGNOSIS — I25.810 CORONARY ARTERY DISEASE INVOLVING CORONARY BYPASS GRAFT OF NATIVE HEART WITHOUT ANGINA PECTORIS: Primary | ICD-10-CM

## 2024-12-20 DIAGNOSIS — I49.5 SSS (SICK SINUS SYNDROME): ICD-10-CM

## 2024-12-20 DIAGNOSIS — Z95.1 HX OF CABG: ICD-10-CM

## 2024-12-20 NOTE — PROGRESS NOTES
Subjective:     Encounter Date:12/20/24      Patient ID: uLciano Tate is a 89 y.o. male.    Chief Complaint:  History of Present Illness    Dear Sabrina,    I had the pleasure of seeing this patient in the office today for a history of coronary disease, prior bypass, sick sinus syndrome, status post pacemaker placement, paroxysmal atrial fibrillation on chronic anticoagulation with warfarin, and mixed hyperlipidemia.    Patient previously lived in [place] and is now moved to [place] to be with his family.  His wife passed away at the start of 2024.    Patient has a history of CAD, status post coronary bypass graft in 2008.  He has a history of sick sinus syndrome with paroxysmal atrial fibrillation and he has a pacemaker in place.  He has been on chronic anticoagulation with warfarin, also on antiarrhythmic therapy with sotalol.  He has a history of hypertension and hyperlipidemia.    Patient comes in because he has been seeing some low blood pressure when he checks his blood pressure with his cuff at home.  She had an episode of syncope with it.  No chest pain or chest discomfort.  No shortness of breath.  He said he was standing, turning, started feeling lightheaded.  And then he fell down.  We checked his blood pressure and it was systolic in the 80s.  He is also been having leg cramps.    2008, April 2008, he had a coronary bypass grafting with sequential mammary to the diagonal and LAD, and also an SVG to the obtuse marginal.  He had a cardiac catheterization performed in 2011 that showed no significant disease per their report.  His pacemaker placed with an A-fib ablation in 2008 and his most recent generator change was April 2017.    He remains on antiarrhythmic therapy for his atrial fibrillation and he has not felt any breakthrough A-fib.    Most recent stress test appears to been performed December 2022.  Patient had a normal pharmacologic stress test with myocardial perfusion imaging with no evidence  "of ischemia and ejection fraction 72%.  He had an echocardiogram performed at the same time, ejection fraction on that was 56% with grade 1 diastolic dysfunction mild to moderate mitral regurgitation no other significant abnormality.    The following portions of the patient's history were reviewed and updated as appropriate: allergies, current medications, past family history, past medical history, past social history, past surgical history and problem list.    Procedures       Objective:     Vitals:    12/20/24 1007   BP: 140/78   BP Location: Left arm   Patient Position: Sitting   Cuff Size: Adult   Pulse: 69   SpO2: 98%   Weight: 83.3 kg (183 lb 9.6 oz)   Height: 170.2 cm (67.01\")     Body mass index is 28.75 kg/m².      Vitals reviewed.   Constitutional:       General: Not in acute distress.     Appearance: Well-developed. Not diaphoretic.   Eyes:      General:         Right eye: No discharge.         Left eye: No discharge.      Conjunctiva/sclera: Conjunctivae normal.   HENT:      Head: Normocephalic and atraumatic.      Nose: Nose normal.   Neck:      Thyroid: No thyromegaly.      Trachea: No tracheal deviation.   Pulmonary:      Effort: Pulmonary effort is normal. No respiratory distress.      Breath sounds: Normal breath sounds. No stridor.   Chest:      Chest wall: Not tender to palpatation.   Cardiovascular:      Normal rate. Regular rhythm.      Murmurs: There is a grade 1/6 high frequency blowing holosystolic murmur at the apex.      . No S3 gallop. No click. No rub.   Pulses:     Intact distal pulses.   Edema:     Peripheral edema absent.   Abdominal:      General: Bowel sounds are normal. There is no distension.      Palpations: Abdomen is soft. There is no abdominal mass.   Musculoskeletal: Normal range of motion.         General: No tenderness or deformity.      Cervical back: Normal range of motion and neck supple. Skin:     General: Skin is warm and dry.      Findings: No erythema or rash. " "  Neurological:      Mental Status: Alert.   Psychiatric:         Attention and Perception: Attention normal.         Data and records reviewed:     Lab Results   Component Value Date    GLUCOSE 97 09/17/2024    BUN 10 09/17/2024    CREATININE 1.24 09/17/2024     09/17/2024    K 4.6 09/17/2024     09/17/2024    CALCIUM 9.4 09/17/2024    ALBUMIN 4.0 09/17/2024    ALT 9 09/17/2024    AST 14 09/17/2024    ALKPHOS 74 09/17/2024    BILITOT 0.7 09/17/2024    BCR 8.1 09/17/2024     No results found for: \"CHOL\"  Lab Results   Component Value Date    TRIG 86 09/17/2024     Lab Results   Component Value Date    HDL 45 09/17/2024     Lab Results   Component Value Date    LDL 83 09/17/2024     Lab Results   Component Value Date    VLDL 16 09/17/2024     No results found for: \"LDLHDL\"  CBC          9/17/2024    10:29   CBC   WBC 5.23    RBC 4.42    Hemoglobin 13.2    Hematocrit 40.1    MCV 90.7    MCH 29.9    MCHC 32.9    RDW 12.6    Platelets 149      No radiology results for the last 90 days.          Assessment:         No diagnosis found.         Plan:       1.  CAD, status post CABG x 3 as outlined above, no angina pectoris, continue current medical therapy  2.  Paroxysmal atrial fibrillation in sinus rhythm, remains on sotalol 80 mg twice daily  3.  Chronic anticoagulation on warfarin, he says they talk to him previously but NOAC therapy but he just does not want to change, I reviewed that again with he and his son today  4.  Sick sinus syndrome, status post pacemaker in place, looks like the device was replaced in 2017.  I do not yet have that procedure note.  We will get him enrolled in our device clinic  5.  Hypertension, excellent control, continue same  6.  Mixed hyperlipidemia on lipid-lowering therapy with simvastatin, continue same.  7.  Mitral regurgitation-mild to moderate on most recent echocardiogram in 2022, continue to follow.  8.  Patient's been having low blood pressure, leg cramps, actually had " an episode of syncope.  He has been on twice daily diuretics which was started by his cardiologist in [place].  I reviewed the records but there is no evidence of any volume overload that I can find documented he certainly has none now.  Will stop the diuretics, have him work to hydrate, report back next week.  9. Currently on warfarin, they are thinking about other anticoagulants, we discussed this at his last visit.  High expense for all of them, with good Rx that is the cheapest for dabigatran.  They can recheck prices after the start of the year and then we can make final decision.  10.  We discussed longitudinal aspects of care for CAD.    Thank you very much for allowing us to participate in the care of this pleasant patient.  Please don't hesitate to call if I can be of assistance in any way.      Current Outpatient Medications:     aspirin 81 MG EC tablet, Take 1 tablet by mouth Daily., Disp: , Rfl:     furosemide (LASIX) 20 MG tablet, Take 1 tablet by mouth 2 (Two) Times a Day., Disp: 180 tablet, Rfl: 0    simvastatin (ZOCOR) 40 MG tablet, Take 1 tablet by mouth Every Night., Disp: , Rfl:     sotalol (BETAPACE) 80 MG tablet, Take 1 tablet by mouth 2 (Two) Times a Day., Disp: , Rfl:     tamsulosin (FLOMAX) 0.4 MG capsule 24 hr capsule, Take 1 capsule by mouth Daily., Disp: 30 capsule, Rfl: 0    warfarin (COUMADIN) 5 MG tablet, Take 1 tablet by mouth Daily. HAS 5MG ON HAND AND CUTS IN 1/2 DUE TO COST LAST INR WAS 2.4 AND WAS REDUCED TO 2.5, Disp: , Rfl:          No follow-ups on file.

## 2024-12-23 ENCOUNTER — ANTICOAGULATION VISIT (OUTPATIENT)
Dept: PHARMACY | Facility: HOSPITAL | Age: 89
End: 2024-12-23
Payer: MEDICARE

## 2024-12-23 DIAGNOSIS — I48.0 PAF (PAROXYSMAL ATRIAL FIBRILLATION): Primary | ICD-10-CM

## 2024-12-23 LAB
INR PPP: 2.5 (ref 0.91–1.09)
PROTHROMBIN TIME: 30.1 SECONDS (ref 10–13.8)

## 2024-12-23 PROCEDURE — G0463 HOSPITAL OUTPT CLINIC VISIT: HCPCS

## 2024-12-23 PROCEDURE — 36416 COLLJ CAPILLARY BLOOD SPEC: CPT

## 2024-12-23 PROCEDURE — 85610 PROTHROMBIN TIME: CPT

## 2024-12-23 NOTE — PROGRESS NOTES
Anticoagulation Clinic Progress Note    Anticoagulation Summary  As of 2024      INR goal:  2.0-3.0   TTR:  74.4% (2.5 mo)   INR used for dosin.5 (2024)   Warfarin maintenance plan:  2.5 mg every day   Weekly warfarin total:  17.5 mg   No change documented:  Ivy Rider, MELANIE   Plan last modified:  Charanjit Horowitz, PharmD (10/8/2024)   Next INR check:  2025   Target end date:  --    Indications    PAF (paroxysmal atrial fibrillation) [I48.0]                 Anticoagulation Episode Summary       INR check location:  --    Preferred lab:  --    [facility] [place]    Comments:  1st visit 10/8/24          Anticoagulation Care Providers       Provider Role Specialty Phone number    Juan Francisco Alexander III, MD Referring Cardiology [phone]            Clinic Interview:  Patient Findings     Negatives:  Signs/symptoms of thrombosis, Signs/symptoms of bleeding,   Laboratory test error suspected, Change in health, Change in alcohol use,   Change in activity, Upcoming invasive procedure, Emergency department   visit, Upcoming dental procedure, Missed doses, Extra doses, Change in   medications, Change in diet/appetite, Hospital admission, Bruising, Other   complaints      Clinical Outcomes     Negatives:  Major bleeding event, Thromboembolic event,   Anticoagulation-related hospital admission, Anticoagulation-related ED   visit, Anticoagulation-related fatality        INR History:      2024    10:00 AM 2024    11:08 AM 10/8/2024     8:30 AM 10/24/2024     2:00 PM 2024     2:00 PM 2024     1:30 PM 2024     1:15 PM   Anticoagulation Monitoring   INR 2.40 -- 2.1 2.6 3.3 1.9 2.5   INR Date 2024  10/8/2024 10/24/2024 2024 2024 2024   INR Goal 2.0-3.0 2.0-3.0 2.0-3.0 2.0-3.0 2.0-3.0 2.0-3.0 2.0-3.0   Trend Same   Same Same Same Same   Last Week Total 35 mg  15 mg 17.5 mg 17.5 mg 17.5 mg 17.5 mg   Next Week Total 35 mg  17.5 mg 17.5  mg 15 mg 17.5 mg 17.5 mg   Sun 5 mg  2.5 mg 2.5 mg 2.5 mg 2.5 mg 2.5 mg   Mon 5 mg  2.5 mg 2.5 mg 2.5 mg 2.5 mg 2.5 mg   Tue 5 mg  2.5 mg 2.5 mg 2.5 mg 2.5 mg 2.5 mg   Wed 5 mg  2.5 mg 2.5 mg 2.5 mg 2.5 mg 2.5 mg   Thu 5 mg  2.5 mg 2.5 mg Hold (11/21); Otherwise 2.5 mg 2.5 mg 2.5 mg   Fri 5 mg  2.5 mg 2.5 mg 2.5 mg 2.5 mg 2.5 mg   Sat 5 mg  2.5 mg 2.5 mg 2.5 mg 2.5 mg 2.5 mg   Visit Report   Report           Plan:  1. INR is Therapeutic today- see above in Anticoagulation Summary.  Will instruct Luciano Tate to Continue their warfarin regimen- see above in Anticoagulation Summary.  2. Follow up in 2 weeks  3. Patient declines warfarin refills.  4. Verbal and written information provided. Patient expresses understanding and has no further questions at this time.    Ivy Rider [facility]

## 2025-01-07 RX ORDER — SOTALOL HYDROCHLORIDE 80 MG/1
80 TABLET ORAL 2 TIMES DAILY
OUTPATIENT
Start: 2025-01-07

## 2025-01-08 ENCOUNTER — ANTICOAGULATION VISIT (OUTPATIENT)
Dept: PHARMACY | Facility: HOSPITAL | Age: 89
End: 2025-01-08
Payer: MEDICARE

## 2025-01-08 DIAGNOSIS — I48.0 PAF (PAROXYSMAL ATRIAL FIBRILLATION): Primary | ICD-10-CM

## 2025-01-08 LAB
INR PPP: 2.7 (ref 0.91–1.09)
PROTHROMBIN TIME: 32.2 SECONDS (ref 10–13.8)

## 2025-01-08 PROCEDURE — 36416 COLLJ CAPILLARY BLOOD SPEC: CPT

## 2025-01-08 PROCEDURE — 85610 PROTHROMBIN TIME: CPT

## 2025-01-08 PROCEDURE — G0463 HOSPITAL OUTPT CLINIC VISIT: HCPCS

## 2025-01-08 NOTE — PROGRESS NOTES
Anticoagulation Clinic Progress Note    Anticoagulation Summary  As of 2025      INR goal:  2.0-3.0   TTR:  78.9% (3 mo)   INR used for dosin.7 (2025)   Warfarin maintenance plan:  2.5 mg every day   Weekly warfarin total:  17.5 mg   No change documented:  Mayuri Llamas, PharmD   Plan last modified:  Charanjit Horowitz, PharmD (10/8/2024)   Next INR check:  2025   Target end date:  --    Indications    PAF (paroxysmal atrial fibrillation) [I48.0]                 Anticoagulation Episode Summary       INR check location:  --    Preferred lab:  --    [facility] [place]    Comments:  1st visit 10/8/24          Anticoagulation Care Providers       Provider Role Specialty Phone number    Juan Francisco Alexander III, MD Referring Cardiology [phone]            Clinic Interview:  Patient Findings     Negatives:  Signs/symptoms of thrombosis, Signs/symptoms of bleeding,   Laboratory test error suspected, Change in health, Change in alcohol use,   Change in activity, Upcoming invasive procedure, Emergency department   visit, Upcoming dental procedure, Missed doses, Extra doses, Change in   medications, Change in diet/appetite, Hospital admission, Bruising, Other   complaints      Clinical Outcomes     Negatives:  Major bleeding event, Thromboembolic event,   Anticoagulation-related hospital admission, Anticoagulation-related ED   visit, Anticoagulation-related fatality        INR History:      2024    11:08 AM 10/8/2024     8:30 AM 10/24/2024     2:00 PM 2024     2:00 PM 2024     1:30 PM 2024     1:15 PM 2025    11:00 AM   Anticoagulation Monitoring   INR -- 2.1 2.6 3.3 1.9 2.5 2.7   INR Date  10/8/2024 10/24/2024 2024 2024 2024 2025   INR Goal 2.0-3.0 2.0-3.0 2.0-3.0 2.0-3.0 2.0-3.0 2.0-3.0 2.0-3.0   Trend   Same Same Same Same Same   Last Week Total  15 mg 17.5 mg 17.5 mg 17.5 mg 17.5 mg 17.5 mg   Next Week Total  17.5 mg 17.5 mg 15  mg 17.5 mg 17.5 mg 17.5 mg   Sun  2.5 mg 2.5 mg 2.5 mg 2.5 mg 2.5 mg 2.5 mg   Mon  2.5 mg 2.5 mg 2.5 mg 2.5 mg 2.5 mg 2.5 mg   Tue  2.5 mg 2.5 mg 2.5 mg 2.5 mg 2.5 mg 2.5 mg   Wed  2.5 mg 2.5 mg 2.5 mg 2.5 mg 2.5 mg 2.5 mg   Thu  2.5 mg 2.5 mg Hold (11/21); Otherwise 2.5 mg 2.5 mg 2.5 mg 2.5 mg   Fri  2.5 mg 2.5 mg 2.5 mg 2.5 mg 2.5 mg 2.5 mg   Sat  2.5 mg 2.5 mg 2.5 mg 2.5 mg 2.5 mg 2.5 mg   Visit Report  Report            Plan:  1. INR is Therapeutic today- see above in Anticoagulation Summary.  Will instruct Luciano Tate to Continue their warfarin regimen- see above in Anticoagulation Summary.  2. Follow up in 4 weeks  3. Patient declines warfarin refills.  4. Verbal and written information provided. Patient expresses understanding and has no further questions at this time.    Mayuri Llamas, RoxanneD

## 2025-01-09 RX ORDER — TAMSULOSIN HYDROCHLORIDE 0.4 MG/1
1 CAPSULE ORAL DAILY
Qty: 30 CAPSULE | Refills: 11 | Status: SHIPPED | OUTPATIENT
Start: 2025-01-09

## 2025-01-09 RX ORDER — SOTALOL HYDROCHLORIDE 80 MG/1
80 TABLET ORAL 2 TIMES DAILY
Qty: 180 TABLET | Refills: 3 | Status: SHIPPED | OUTPATIENT
Start: 2025-01-09

## 2025-01-09 NOTE — TELEPHONE ENCOUNTER
Caller: michelle hancock    Relationship: Emergency Contact    Best call back number: [phone]    Requested Prescriptions:   Requested Prescriptions     Pending Prescriptions Disp Refills    sotalol (BETAPACE) 80 MG tablet       Sig: Take 1 tablet by mouth 2 (Two) Times a Day.        Pharmacy where request should be sent: [facility] [address] [phone] [facility] [phone]      Last office visit with prescribing clinician: 12/20/2024   Last telemedicine visit with prescribing clinician: Visit date not found   Next office visit with prescribing clinician: 10/10/2025     Additional details provided by patient: PATIENT HAS TWO WEEKS ON HAND AND NEEDS ENOUGH REFILLS TO LAST TIL OCT WHEN HE SEES DR DAMON NEXT    Does the patient have less than a 3 day supply:  [] Yes  [x] No    Would you like a call back once the refill request has been completed: [x] Yes [] No    If the office needs to give you a call back, can they leave a voicemail: [x] Yes [] No    Latha Esparza Rep   01/09/25 09:06 EST

## 2025-02-05 ENCOUNTER — ANTICOAGULATION VISIT (OUTPATIENT)
Dept: PHARMACY | Facility: HOSPITAL | Age: 89
End: 2025-02-05
Payer: MEDICARE

## 2025-02-05 DIAGNOSIS — I48.0 PAF (PAROXYSMAL ATRIAL FIBRILLATION): Primary | ICD-10-CM

## 2025-02-05 LAB
INR PPP: 1.7 (ref 0.91–1.09)
PROTHROMBIN TIME: 20.6 SECONDS (ref 10–13.8)

## 2025-02-05 PROCEDURE — G0463 HOSPITAL OUTPT CLINIC VISIT: HCPCS

## 2025-02-05 PROCEDURE — 85610 PROTHROMBIN TIME: CPT

## 2025-02-05 NOTE — PROGRESS NOTES
Anticoagulation Clinic Progress Note    Anticoagulation Summary  As of 2025      INR goal:  2.0-3.0   TTR:  76.8% (3.9 mo)   INR used for dosin.7 (2025)   Warfarin maintenance plan:  2.5 mg every day   Weekly warfarin total:  17.5 mg   Plan last modified:  Charanjit Horowitz, PharmD (10/8/2024)   Next INR check:  2025   Target end date:  --    Indications    PAF (paroxysmal atrial fibrillation) [I48.0]                 Anticoagulation Episode Summary       INR check location:  --    Preferred lab:  --    Send INR reminders to:   [facility]    Comments:  1st visit 10/8/24          Anticoagulation Care Providers       Provider Role Specialty Phone number    Juan Francisco Alexander III, MD Referring Cardiology [phone]            Clinic Interview:  Patient Findings     Positives:  Change in diet/appetite    Negatives:  Signs/symptoms of thrombosis, Signs/symptoms of bleeding,   Laboratory test error suspected, Change in health, Change in alcohol use,   Change in activity, Upcoming invasive procedure, Emergency department   visit, Upcoming dental procedure, Missed doses, Extra doses, Change in   medications, Hospital admission, Bruising, Other complaints      Clinical Outcomes     Negatives:  Major bleeding event, Thromboembolic event,   Anticoagulation-related hospital admission, Anticoagulation-related ED   visit, Anticoagulation-related fatality        INR History:      10/8/2024     8:30 AM 10/24/2024     2:00 PM 2024     2:00 PM 2024     1:30 PM 2024     1:15 PM 2025    11:00 AM 2025    10:30 AM   Anticoagulation Monitoring   INR 2.1 2.6 3.3 1.9 2.5 2.7 1.7   INR Date 10/8/2024 10/24/2024 2024 2024 2024 2025 2025   INR Goal 2.0-3.0 2.0-3.0 2.0-3.0 2.0-3.0 2.0-3.0 2.0-3.0 2.0-3.0   Trend  Same Same Same Same Same Same   Last Week Total 15 mg 17.5 mg 17.5 mg 17.5 mg 17.5 mg 17.5 mg 17.5 mg   Next Week Total 17.5 mg 17.5 mg 15 mg 17.5 mg 17.5 mg  17.5 mg 20 mg   Sun 2.5 mg 2.5 mg 2.5 mg 2.5 mg 2.5 mg 2.5 mg 2.5 mg   Mon 2.5 mg 2.5 mg 2.5 mg 2.5 mg 2.5 mg 2.5 mg 2.5 mg   Tue 2.5 mg 2.5 mg 2.5 mg 2.5 mg 2.5 mg 2.5 mg 2.5 mg   Wed 2.5 mg 2.5 mg 2.5 mg 2.5 mg 2.5 mg 2.5 mg 5 mg (2/5); Otherwise 2.5 mg   Thu 2.5 mg 2.5 mg Hold (11/21); Otherwise 2.5 mg 2.5 mg 2.5 mg 2.5 mg 2.5 mg   Fri 2.5 mg 2.5 mg 2.5 mg 2.5 mg 2.5 mg 2.5 mg 2.5 mg   Sat 2.5 mg 2.5 mg 2.5 mg 2.5 mg 2.5 mg 2.5 mg 2.5 mg   Visit Report Report             Plan:  1. INR is Subtherapeutic today- see above in Anticoagulation Summary.  Will instruct Luciano Tate to Increase their warfarin regimen- see above in Anticoagulation Summary.  increase in salads and asparagus. Boost to 5 mg then resume, rck 2 weeks   2. Follow up in 2 weeks  3. Patient declines warfarin refills.  4. Verbal and written information provided. Patient expresses understanding and has no further questions at this time.    Awa Enriquez [facility]

## 2025-02-11 RX ORDER — FUROSEMIDE 20 MG/1
20 TABLET ORAL 2 TIMES DAILY
Qty: 180 TABLET | Refills: 3 | OUTPATIENT
Start: 2025-02-11

## 2025-02-19 ENCOUNTER — APPOINTMENT (OUTPATIENT)
Dept: PHARMACY | Facility: HOSPITAL | Age: 89
End: 2025-02-19
Payer: MEDICARE

## 2025-02-24 ENCOUNTER — ANTICOAGULATION VISIT (OUTPATIENT)
Dept: PHARMACY | Facility: HOSPITAL | Age: 89
End: 2025-02-24
Payer: MEDICARE

## 2025-02-24 DIAGNOSIS — I48.0 PAF (PAROXYSMAL ATRIAL FIBRILLATION): Primary | ICD-10-CM

## 2025-02-24 LAB
INR PPP: 2.8 (ref 0.91–1.09)
PROTHROMBIN TIME: 33.2 SECONDS (ref 10–13.8)

## 2025-02-24 PROCEDURE — G0463 HOSPITAL OUTPT CLINIC VISIT: HCPCS

## 2025-02-24 PROCEDURE — 85610 PROTHROMBIN TIME: CPT

## 2025-02-24 NOTE — PROGRESS NOTES
Anticoagulation Clinic Progress Note    Anticoagulation Summary  As of 2025      INR goal:  2.0-3.0   TTR:  76.2% (4.6 mo)   INR used for dosin.8 (2025)   Warfarin maintenance plan:  2.5 mg every day   Weekly warfarin total:  17.5 mg   No change documented:  Orlando Castaneda, Pharmacy Intern   Plan last modified:  Charanjit Horowitz, PharmD (10/8/2024)   Next INR check:  3/10/2025   Target end date:  --    Indications    PAF (paroxysmal atrial fibrillation) [I48.0]                 Anticoagulation Episode Summary       INR check location:  --    Preferred lab:  --    Send INR reminders to:   [facility]    Comments:  1st visit 10/8/24          Anticoagulation Care Providers       Provider Role Specialty Phone number    Juan Francisco Alexander III, MD Referring Cardiology [phone]            Clinic Interview:  Patient Findings     Negatives:  Signs/symptoms of thrombosis, Signs/symptoms of bleeding,   Laboratory test error suspected, Change in health, Change in alcohol use,   Change in activity, Upcoming invasive procedure, Emergency department   visit, Upcoming dental procedure, Missed doses, Extra doses, Change in   medications, Change in diet/appetite, Hospital admission, Bruising, Other   complaints    Comments:  25: INR 2.8, no changes, cont current dosing regimen (2.5   mg daily), recheck 2 weeks.       Clinical Outcomes     Negatives:  Major bleeding event, Thromboembolic event,   Anticoagulation-related hospital admission, Anticoagulation-related ED   visit, Anticoagulation-related fatality    Comments:  25: INR 2.8, no changes, cont current dosing regimen (2.5   mg daily), recheck 2 weeks.         INR History:      10/24/2024     2:00 PM 2024     2:00 PM 2024     1:30 PM 2024     1:15 PM 2025    11:00 AM 2025    10:30 AM 2025    10:30 AM   Anticoagulation Monitoring   INR 2.6 3.3 1.9 2.5 2.7 1.7 2.8   INR Date 10/24/2024 2024 2024  12/23/2024 1/8/2025 2/5/2025 2/24/2025   INR Goal 2.0-3.0 2.0-3.0 2.0-3.0 2.0-3.0 2.0-3.0 2.0-3.0 2.0-3.0   Trend Same Same Same Same Same Same Same   Last Week Total 17.5 mg 17.5 mg 17.5 mg 17.5 mg 17.5 mg 17.5 mg 17.5 mg   Next Week Total 17.5 mg 15 mg 17.5 mg 17.5 mg 17.5 mg 20 mg 17.5 mg   Sun 2.5 mg 2.5 mg 2.5 mg 2.5 mg 2.5 mg 2.5 mg 2.5 mg   Mon 2.5 mg 2.5 mg 2.5 mg 2.5 mg 2.5 mg 2.5 mg 2.5 mg   Tue 2.5 mg 2.5 mg 2.5 mg 2.5 mg 2.5 mg 2.5 mg 2.5 mg   Wed 2.5 mg 2.5 mg 2.5 mg 2.5 mg 2.5 mg 5 mg (2/5); Otherwise 2.5 mg 2.5 mg   Thu 2.5 mg Hold (11/21); Otherwise 2.5 mg 2.5 mg 2.5 mg 2.5 mg 2.5 mg 2.5 mg   Fri 2.5 mg 2.5 mg 2.5 mg 2.5 mg 2.5 mg 2.5 mg 2.5 mg   Sat 2.5 mg 2.5 mg 2.5 mg 2.5 mg 2.5 mg 2.5 mg 2.5 mg       Plan:  1. INR is Therapeutic today- see above in Anticoagulation Summary.  Will instruct Luciano Tate to Continue their warfarin regimen (2.5 mg daily) - see above in Anticoagulation Summary.   2. Follow up in 2 weeks  3. Patient declines warfarin refills.  4. Verbal and written information provided. Patient expresses understanding and has no further questions at this time.    Anuradha GilesBerry, Pharmacy Intern

## 2025-02-24 NOTE — PROGRESS NOTES
I have supervised and reviewed the notes, assessments, and/or procedures performed. The documented assessment and plan were developed cooperatively, and the plan was implemented in my presence. I concur with the documentation of this patient encounter.    Awa Enriquez, [facility]

## 2025-02-25 DIAGNOSIS — E78.2 MIXED HYPERLIPIDEMIA: ICD-10-CM

## 2025-02-25 DIAGNOSIS — E53.8 VITAMIN B12 DEFICIENCY: ICD-10-CM

## 2025-02-25 DIAGNOSIS — Z12.5 SCREENING FOR PROSTATE CANCER: ICD-10-CM

## 2025-02-25 DIAGNOSIS — E55.9 VITAMIN D DEFICIENCY: ICD-10-CM

## 2025-03-04 LAB
25(OH)D3+25(OH)D2 SERPL-MCNC: 28.3 NG/ML (ref 30–100)
ALBUMIN SERPL-MCNC: 3.9 G/DL (ref 3.5–5.2)
ALBUMIN/GLOB SERPL: 1.6 G/DL
ALP SERPL-CCNC: 75 U/L (ref 39–117)
ALT SERPL-CCNC: 10 U/L (ref 1–41)
AST SERPL-CCNC: 15 U/L (ref 1–40)
BASOPHILS # BLD AUTO: 0.04 10*3/MM3 (ref 0–0.2)
BASOPHILS NFR BLD AUTO: 0.7 % (ref 0–1.5)
BILIRUB SERPL-MCNC: 0.6 MG/DL (ref 0–1.2)
BUN SERPL-MCNC: 11 MG/DL (ref 8–23)
BUN/CREAT SERPL: 8.4 (ref 7–25)
CALCIUM SERPL-MCNC: 9.5 MG/DL (ref 8.6–10.5)
CHLORIDE SERPL-SCNC: 105 MMOL/L (ref 98–107)
CHOLEST SERPL-MCNC: 144 MG/DL (ref 0–200)
CO2 SERPL-SCNC: 31.9 MMOL/L (ref 22–29)
CREAT SERPL-MCNC: 1.31 MG/DL (ref 0.76–1.27)
EGFRCR SERPLBLD CKD-EPI 2021: 52.4 ML/MIN/1.73
EOSINOPHIL # BLD AUTO: 0.14 10*3/MM3 (ref 0–0.4)
EOSINOPHIL NFR BLD AUTO: 2.6 % (ref 0.3–6.2)
ERYTHROCYTE [DISTWIDTH] IN BLOOD BY AUTOMATED COUNT: 12.7 % (ref 12.3–15.4)
GLOBULIN SER CALC-MCNC: 2.4 GM/DL
GLUCOSE SERPL-MCNC: 94 MG/DL (ref 65–99)
HCT VFR BLD AUTO: 43.8 % (ref 37.5–51)
HDLC SERPL-MCNC: 50 MG/DL (ref 40–60)
HGB BLD-MCNC: 14.2 G/DL (ref 13–17.7)
IMM GRANULOCYTES # BLD AUTO: 0.01 10*3/MM3 (ref 0–0.05)
IMM GRANULOCYTES NFR BLD AUTO: 0.2 % (ref 0–0.5)
LDLC SERPL CALC-MCNC: 80 MG/DL (ref 0–100)
LYMPHOCYTES # BLD AUTO: 1.21 10*3/MM3 (ref 0.7–3.1)
LYMPHOCYTES NFR BLD AUTO: 22.6 % (ref 19.6–45.3)
MCH RBC QN AUTO: 30 PG (ref 26.6–33)
MCHC RBC AUTO-ENTMCNC: 32.4 G/DL (ref 31.5–35.7)
MCV RBC AUTO: 92.4 FL (ref 79–97)
MONOCYTES # BLD AUTO: 0.36 10*3/MM3 (ref 0.1–0.9)
MONOCYTES NFR BLD AUTO: 6.7 % (ref 5–12)
NEUTROPHILS # BLD AUTO: 3.59 10*3/MM3 (ref 1.7–7)
NEUTROPHILS NFR BLD AUTO: 67.2 % (ref 42.7–76)
PLATELET # BLD AUTO: 127 10*3/MM3 (ref 140–450)
POTASSIUM SERPL-SCNC: 5.1 MMOL/L (ref 3.5–5.2)
PROT SERPL-MCNC: 6.3 G/DL (ref 6–8.5)
RBC # BLD AUTO: 4.74 10*6/MM3 (ref 4.14–5.8)
SODIUM SERPL-SCNC: 143 MMOL/L (ref 136–145)
TRIGL SERPL-MCNC: 67 MG/DL (ref 0–150)
TSH SERPL DL<=0.005 MIU/L-ACNC: 2.52 UIU/ML (ref 0.27–4.2)
VLDLC SERPL CALC-MCNC: 14 MG/DL (ref 5–40)
WBC # BLD AUTO: 5.35 10*3/MM3 (ref 3.4–10.8)

## 2025-03-12 ENCOUNTER — ANTICOAGULATION VISIT (OUTPATIENT)
Dept: PHARMACY | Facility: HOSPITAL | Age: 89
End: 2025-03-12
Payer: MEDICARE

## 2025-03-12 DIAGNOSIS — I48.0 PAF (PAROXYSMAL ATRIAL FIBRILLATION): Primary | ICD-10-CM

## 2025-03-12 LAB
INR PPP: 1.9 (ref 0.91–1.09)
PROTHROMBIN TIME: 22.6 SECONDS (ref 10–13.8)

## 2025-03-12 PROCEDURE — 85610 PROTHROMBIN TIME: CPT

## 2025-03-12 PROCEDURE — G0463 HOSPITAL OUTPT CLINIC VISIT: HCPCS

## 2025-03-12 NOTE — PROGRESS NOTES
Anticoagulation Clinic Progress Note    Anticoagulation Summary  As of 3/12/2025      INR goal:  2.0-3.0   TTR:  77.5% (5.1 mo)   INR used for dosin.9 (3/12/2025)   Warfarin maintenance plan:  2.5 mg every day   Weekly warfarin total:  17.5 mg   No change documented:  Orlando Castaneda, Pharmacy Intern   Plan last modified:  Charanjit Horowitz, PharmD (10/8/2024)   Next INR check:  3/26/2025   Target end date:  --    Indications    PAF (paroxysmal atrial fibrillation) [I48.0]                 Anticoagulation Episode Summary       INR check location:  --    Preferred lab:  --    Send INR reminders to:   [facility]    Comments:  1st visit 10/8/24          Anticoagulation Care Providers       Provider Role Specialty Phone number    Juan Francisco Alexander III, MD Referring Cardiology [phone]            Clinic Interview:  Patient Findings     Negatives:  Signs/symptoms of thrombosis, Signs/symptoms of bleeding,   Laboratory test error suspected, Change in health, Change in alcohol use,   Change in activity, Upcoming invasive procedure, Emergency department   visit, Upcoming dental procedure, Missed doses, Extra doses, Change in   medications, Change in diet/appetite, Hospital admission, Bruising, Other   complaints      Clinical Outcomes     Negatives:  Major bleeding event, Thromboembolic event,   Anticoagulation-related hospital admission, Anticoagulation-related ED   visit, Anticoagulation-related fatality        INR History:      2024     2:00 PM 2024     1:30 PM 2024     1:15 PM 2025    11:00 AM 2025    10:30 AM 2025    10:30 AM 3/12/2025    11:00 AM   Anticoagulation Monitoring   INR 3.3 1.9 2.5 2.7 1.7 2.8 1.9   INR Date 2024 2024 2024 2025 2025 2025 3/12/2025   INR Goal 2.0-3.0 2.0-3.0 2.0-3.0 2.0-3.0 2.0-3.0 2.0-3.0 2.0-3.0   Trend Same Same Same Same Same Same Same   Last Week Total 17.5 mg 17.5 mg 17.5 mg 17.5 mg 17.5 mg 17.5 mg 17.5  mg   Next Week Total 15 mg 17.5 mg 17.5 mg 17.5 mg 20 mg 17.5 mg 17.5 mg   Sun 2.5 mg 2.5 mg 2.5 mg 2.5 mg 2.5 mg 2.5 mg 2.5 mg   Mon 2.5 mg 2.5 mg 2.5 mg 2.5 mg 2.5 mg 2.5 mg 2.5 mg   Tue 2.5 mg 2.5 mg 2.5 mg 2.5 mg 2.5 mg 2.5 mg 2.5 mg   Wed 2.5 mg 2.5 mg 2.5 mg 2.5 mg 5 mg (2/5); Otherwise 2.5 mg 2.5 mg 2.5 mg   Thu Hold (11/21); Otherwise 2.5 mg 2.5 mg 2.5 mg 2.5 mg 2.5 mg 2.5 mg 2.5 mg   Fri 2.5 mg 2.5 mg 2.5 mg 2.5 mg 2.5 mg 2.5 mg 2.5 mg   Sat 2.5 mg 2.5 mg 2.5 mg 2.5 mg 2.5 mg 2.5 mg 2.5 mg       Plan:  1. INR is Subtherapeutic today- see above in Anticoagulation Summary.  Will instruct Luciano Tate to Continue their warfarin regimen (2.5 mg daily) - see above in Anticoagulation Summary.  2. Follow up in 2 weeks  3. Patient declines warfarin refills.  4. Verbal and written information provided. Patient expresses understanding and has no further questions at this time.    Anuradha GilesEunice, Pharmacy Intern

## 2025-03-12 NOTE — PROGRESS NOTES
I have supervised and reviewed the notes, assessments, and/or procedures performed. The documented assessment and plan were developed cooperatively, and the plan was implemented in my presence. I concur with the documentation of this patient encounter.    Awa Enriquez, [facility]

## 2025-03-14 ENCOUNTER — OFFICE VISIT (OUTPATIENT)
Dept: FAMILY MEDICINE CLINIC | Facility: CLINIC | Age: 89
End: 2025-03-14
Payer: MEDICARE

## 2025-03-14 VITALS
HEART RATE: 84 BPM | SYSTOLIC BLOOD PRESSURE: 158 MMHG | DIASTOLIC BLOOD PRESSURE: 80 MMHG | WEIGHT: 187 LBS | BODY MASS INDEX: 29.35 KG/M2 | HEIGHT: 67 IN | OXYGEN SATURATION: 98 %

## 2025-03-14 DIAGNOSIS — Z00.00 MEDICARE ANNUAL WELLNESS VISIT, SUBSEQUENT: Primary | ICD-10-CM

## 2025-03-14 NOTE — PATIENT INSTRUCTIONS
Medicare Wellness  Personal Prevention Plan of Service     Date of Office Visit:    Encounter Provider:  ROCÍO Arreola  Place of Service:  [facility]  Patient Name: Luciano Tate  :  1935    As part of the Medicare Wellness portion of your visit today, we are providing you with this personalized preventive plan of services (PPPS). This plan is based upon recommendations of the [place] Preventive Services Task Force (USPSTF) and the Advisory Committee on Immunization Practices (ACIP).    This lists the preventive care services that should be considered, and provides dates of when you are due. Items listed as completed are up-to-date and do not require any further intervention.    Health Maintenance   Topic Date Due    BMI FOLLOWUP  Never done    TDAP/TD VACCINES (1 - Tdap) Never done    Pneumococcal Vaccine 50+ (1 of 1 - PCV) Never done    ZOSTER VACCINE (1 of 2) Never done    RSV Vaccine - Adults (1 - 1-dose 75+ series) Never done    INFLUENZA VACCINE  Never done    COVID-19 Vaccine ( - - season) Never done    ANNUAL WELLNESS VISIT  Never done    LIPID PANEL  2026       No orders of the defined types were placed in this encounter.      Return if symptoms worsen or fail to improve, for Next scheduled follow up.

## 2025-03-14 NOTE — PROGRESS NOTES
Subjective   The ABCs of the Annual Wellness Visit  Medicare Wellness Visit      Luciano Tate is a 89 y.o. patient who presents for a Medicare Wellness Visit.    The following portions of the patient's history were reviewed and   updated as appropriate: allergies, current medications, past family history, past medical history, past social history, past surgical history, and problem list.    Compared to one year ago, the patient's physical   health is the same.  Compared to one year ago, the patient's mental   health is the same.    Recent Hospitalizations:  He was not admitted to the hospital during the last year.     Current Medical Providers:  Patient Care Team:  Sabrina Rhodes)ROCÍO as PCP - General (Family Medicine)    Outpatient Medications Prior to Visit   Medication Sig Dispense Refill    aspirin 81 MG EC tablet Take 1 tablet by mouth Daily.      simvastatin (ZOCOR) 40 MG tablet Take 1 tablet by mouth Every Night.      sotalol (BETAPACE) 80 MG tablet Take 1 tablet by mouth 2 (Two) Times a Day. 180 tablet 3    tamsulosin (FLOMAX) 0.4 MG capsule 24 hr capsule TAKE 1 CAPSULE DAILY 30 capsule 11    warfarin (COUMADIN) 5 MG tablet Take 1 tablet by mouth Daily. HAS 5MG ON HAND AND CUTS IN 1/2 DUE TO COST  LAST INR WAS 2.4 AND WAS REDUCED TO 2.5       No facility-administered medications prior to visit.     No opioid medication identified on active medication list. I have reviewed chart for other potential  high risk medication/s and harmful drug interactions in the elderly.      Aspirin is on active medication list. Aspirin use is not indicated based on review of current medical condition/s. Risk of harm outweighs potential benefits. Patient instructed to discontinue this medication.  .      Patient Active Problem List   Diagnosis    Hx of CABG    Pacemaker    PAF (paroxysmal atrial fibrillation)    Coronary artery disease involving coronary bypass graft of native heart without angina pectoris    SSS  "(sick sinus syndrome)    Mixed hyperlipidemia     Advance Care Planning Advance Directive is not on file.  ACP discussion was held with the patient during this visit. Patient has an advance directive (not in EMR), copy requested.            Objective   Vitals:    25 1415   BP: 158/80   Pulse: 84   SpO2: 98%   Weight: 84.8 kg (187 lb)   Height: 170.2 cm (67\")   PainSc: 0-No pain       Estimated body mass index is 29.29 kg/m² as calculated from the following:    Height as of this encounter: 170.2 cm (67\").    Weight as of this encounter: 84.8 kg (187 lb).               Does the patient have evidence of cognitive impairment? No  Lab Results   Component Value Date    CHLPL 144 2025    TRIG 67 2025    HDL 50 2025    LDL 80 2025    VLDL 14 2025                                                                                               Health  Risk Assessment    Smoking Status:  Social History     Tobacco Use   Smoking Status Former    Current packs/day: 0.00    Average packs/day: 1 pack/day for 60.0 years (60.0 ttl pk-yrs)    Types: Cigarettes    Start date: 1955    Quit date: 2000    Years since quittin.2    Passive exposure: Past   Smokeless Tobacco Never   Tobacco Comments    Smoked off and on and quit when had a traumatic injury     Alcohol Consumption:  Social History     Substance and Sexual Activity   Alcohol Use Not Currently       Fall Risk Screen  STEADI Fall Risk Assessment was completed, and patient is at LOW risk for falls.Assessment completed on:3/14/2025    Depression Screening   Little interest or pleasure in doing things? Not at all   Feeling down, depressed, or hopeless? Not at all   PHQ-2 Total Score 0      Health Habits and Functional and Cognitive Screening:      3/7/2025     9:06 AM   Functional & Cognitive Status   Do you have difficulty preparing food and eating? No   Do you have difficulty bathing yourself, getting dressed or grooming yourself? No "   Do you have difficulty using the toilet? No   Do you have difficulty moving around from place to place? No   Do you have trouble with steps or getting out of a bed or a chair? No   Current Diet Other   Dental Exam Unknown   Eye Exam Up to date   Exercise (times per week) 2 times per week   Current Exercises Include Walking   Do you need help using the phone?  No   Are you deaf or do you have serious difficulty hearing?  Yes   Do you need help to go to places out of walking distance? No   Do you need help shopping? No   Do you need help preparing meals?  No   Do you need help with housework?  No   Do you need help with laundry? No   Do you need help taking your medications? No   Do you need help managing money? No   Do you ever drive or ride in a car without wearing a seat belt? No   Have you felt unusual stress, anger or loneliness in the last month? No   Who do you live with? Child   If you need help, do you have trouble finding someone available to you? No   Have you been bothered in the last four weeks by sexual problems? No   Do you have difficulty concentrating, remembering or making decisions? No           Age-appropriate Screening Schedule:  Refer to the list below for future screening recommendations based on patient's age, sex and/or medical conditions. Orders for these recommended tests are listed in the plan section. The patient has been provided with a written plan.    Health Maintenance List  Health Maintenance   Topic Date Due    BMI FOLLOWUP  Never done    TDAP/TD VACCINES (1 - Tdap) Never done    Pneumococcal Vaccine 50+ (1 of 1 - PCV) Never done    ZOSTER VACCINE (1 of 2) Never done    RSV Vaccine - Adults (1 - 1-dose 75+ series) Never done    INFLUENZA VACCINE  Never done    COVID-19 Vaccine (1 - 2024-25 season) Never done    ANNUAL WELLNESS VISIT  Never done    LIPID PANEL  03/03/2026                                                                                                                                                 CMS Preventative Services Quick Reference  Risk Factors Identified During Encounter  None Identified    The above risks/problems have been discussed with the patient.  Pertinent information has been shared with the patient in the After Visit Summary.  An After Visit Summary and PPPS were made available to the patient.    Follow Up:   Next Medicare Wellness visit to be scheduled in 1 year.     Assessment & Plan  Medicare annual wellness visit, subsequent              Follow Up:   No follow-ups on file.          Patient and son report that the furosemide was discontinued by Dr. Alexander due to patient having lightheadedness that was thought to be from dehydration.  Patient is not certain why he was originally started on medication.  He denies any history of lower extremity edema and denies having any edema since stopping the furosemide.  He denies history of heart failure though does have other extensive cardiovascular history including CABG and pacemaker placement.  He does report feeling increasingly short of breath since he has stopped the furosemide.  Lungs are clear on auscultation today.  He has not had a recent echocardiogram.  He does not have a follow-up again with Dr. Alexander until August.  I encouraged him and son to contact cardiology office to notify of feeling increasingly short of breath since stopping the furosemide.  They agreed to notify cardiology of this.

## 2025-03-25 ENCOUNTER — OFFICE VISIT (OUTPATIENT)
Dept: CARDIOLOGY | Age: 89
End: 2025-03-25
Payer: MEDICARE

## 2025-03-25 VITALS
BODY MASS INDEX: 29.1 KG/M2 | DIASTOLIC BLOOD PRESSURE: 74 MMHG | HEART RATE: 86 BPM | WEIGHT: 185.4 LBS | OXYGEN SATURATION: 97 % | SYSTOLIC BLOOD PRESSURE: 140 MMHG | HEIGHT: 67 IN

## 2025-03-25 DIAGNOSIS — I25.810 CORONARY ARTERY DISEASE INVOLVING CORONARY BYPASS GRAFT OF NATIVE HEART WITHOUT ANGINA PECTORIS: Primary | ICD-10-CM

## 2025-03-25 DIAGNOSIS — I48.0 PAF (PAROXYSMAL ATRIAL FIBRILLATION): ICD-10-CM

## 2025-03-25 DIAGNOSIS — Z95.1 HX OF CABG: ICD-10-CM

## 2025-03-25 DIAGNOSIS — E78.2 MIXED HYPERLIPIDEMIA: ICD-10-CM

## 2025-03-25 DIAGNOSIS — I49.5 SSS (SICK SINUS SYNDROME): ICD-10-CM

## 2025-03-25 DIAGNOSIS — Z95.0 PACEMAKER: ICD-10-CM

## 2025-03-25 PROCEDURE — 99214 OFFICE O/P EST MOD 30 MIN: CPT | Performed by: INTERNAL MEDICINE

## 2025-03-25 PROCEDURE — G2211 COMPLEX E/M VISIT ADD ON: HCPCS | Performed by: INTERNAL MEDICINE

## 2025-03-25 RX ORDER — DABIGATRAN ETEXILATE 150 MG/1
150 CAPSULE ORAL 2 TIMES DAILY
Qty: 180 CAPSULE | Refills: 3 | Status: SHIPPED | OUTPATIENT
Start: 2025-03-25

## 2025-03-25 NOTE — PROGRESS NOTES
Subjective:     Encounter Date:03/25/25      Patient ID: Luciano Tate is a 89 y.o. male.    Chief Complaint:  History of Present Illness    Dear Sabrina,    I had the pleasure of seeing this patient in the office today for a history of coronary disease, prior bypass, sick sinus syndrome, status post pacemaker placement, paroxysmal atrial fibrillation on chronic anticoagulation with warfarin, and mixed hyperlipidemia.    Patient previously lived in [place] and is now moved to [place] to be with his family.  His wife passed away at the start of 2024.    Patient has a history of CAD, status post coronary bypass graft in 2008.  He has a history of sick sinus syndrome with paroxysmal atrial fibrillation and he has a pacemaker in place.  He has been on chronic anticoagulation with warfarin, also on antiarrhythmic therapy with sotalol.  He has a history of hypertension and hyperlipidemia.    He was sent in by yourself because he mention to you at his visit that he was having some shortness of breath when he walked.  He had been on diuretics in the past, we discontinued those because he was having dizziness.  The dizziness resolved off diuretics and he has been feeling much better.  He has not noticed any lower extremity edema.  No cough, no pain or discomfort.  No lower extreme edema.  Weights been stable.    Additionally, he is interested in transitioning to dabigatran from the warfarin.    2008, April 2008, he had a coronary bypass grafting with sequential mammary to the diagonal and LAD, and also an SVG to the obtuse marginal.  He had a cardiac catheterization performed in 2011 that showed no significant disease per their report.  His pacemaker placed with an A-fib ablation in 2008 and his most recent generator change was April 2017.    He remains on antiarrhythmic therapy for his atrial fibrillation and he has not felt any breakthrough A-fib.    Most recent stress test appears to been performed December 2022.   "Patient had a normal pharmacologic stress test with myocardial perfusion imaging with no evidence of ischemia and ejection fraction 72%.  He had an echocardiogram performed at the same time, ejection fraction on that was 56% with grade 1 diastolic dysfunction mild to moderate mitral regurgitation no other significant abnormality.    The following portions of the patient's history were reviewed and updated as appropriate: allergies, current medications, past family history, past medical history, past social history, past surgical history and problem list.    Procedures       Objective:     Vitals:    03/25/25 1105   BP: 140/74   BP Location: Left arm   Patient Position: Sitting   Cuff Size: Adult   Pulse: 86   SpO2: 97%   Weight: 84.1 kg (185 lb 6.4 oz)   Height: 170.2 cm (67\")     Body mass index is 29.04 kg/m².      Vitals reviewed.   Constitutional:       General: Not in acute distress.     Appearance: Well-developed. Not diaphoretic.   Eyes:      General:         Right eye: No discharge.         Left eye: No discharge.      Conjunctiva/sclera: Conjunctivae normal.   HENT:      Head: Normocephalic and atraumatic.      Nose: Nose normal.   Neck:      Thyroid: No thyromegaly.      Trachea: No tracheal deviation.   Pulmonary:      Effort: Pulmonary effort is normal. No respiratory distress.      Breath sounds: Normal breath sounds. No stridor.   Chest:      Chest wall: Not tender to palpatation.   Cardiovascular:      Normal rate. Regular rhythm.      Murmurs: There is a grade 1/6 high frequency blowing holosystolic murmur at the apex.      . No S3 gallop. No click. No rub.   Pulses:     Intact distal pulses.   Edema:     Peripheral edema absent.   Abdominal:      General: Bowel sounds are normal. There is no distension.      Palpations: Abdomen is soft. There is no abdominal mass.   Musculoskeletal: Normal range of motion.         General: No tenderness or deformity.      Cervical back: Normal range of motion and " "neck supple. Skin:     General: Skin is warm and dry.      Findings: No erythema or rash.   Neurological:      Mental Status: Alert.   Psychiatric:         Attention and Perception: Attention normal.         Data and records reviewed:     Lab Results   Component Value Date    GLUCOSE 94 03/03/2025    BUN 11 03/03/2025    CREATININE 1.31 (H) 03/03/2025     03/03/2025    K 5.1 03/03/2025     03/03/2025    CALCIUM 9.5 03/03/2025    PROTEINTOT 6.3 03/03/2025    ALBUMIN 3.9 03/03/2025    ALT 10 03/03/2025    AST 15 03/03/2025    ALKPHOS 75 03/03/2025    BILITOT 0.6 03/03/2025    GLOB 2.4 03/03/2025    AGRATIO 1.6 03/03/2025    BCR 8.4 03/03/2025    EGFR 52.4 (L) 03/03/2025     No results found for: \"CHOL\"  Lab Results   Component Value Date    TRIG 67 03/03/2025    TRIG 86 09/17/2024     Lab Results   Component Value Date    HDL 50 03/03/2025    HDL 45 09/17/2024     Lab Results   Component Value Date    LDL 80 03/03/2025    LDL 83 09/17/2024     Lab Results   Component Value Date    VLDL 14 03/03/2025    VLDL 16 09/17/2024     No results found for: \"LDLHDL\"  CBC          9/17/2024    10:29 3/3/2025    00:00   CBC   WBC 5.23  5.35    RBC 4.42  4.74    Hemoglobin 13.2  14.2    Hematocrit 40.1  43.8    MCV 90.7  92.4    MCH 29.9  30.0    MCHC 32.9  32.4    RDW 12.6  12.7    Platelets 149  127      No radiology results for the last 90 days.          Assessment:          Diagnosis Plan   1. Coronary artery disease involving coronary bypass graft of native heart without angina pectoris  Adult Transthoracic Echo Complete W/ Cont if Necessary Per Protocol    dabigatran etexilate (PRADAXA) 150 MG capsu      2. Hx of CABG  Adult Transthoracic Echo Complete W/ Cont if Necessary Per Protocol    dabigatran etexilate (PRADAXA) 150 MG capsu      3. Mixed hyperlipidemia  Adult Transthoracic Echo Complete W/ Cont if Necessary Per Protocol    dabigatran etexilate (PRADAXA) 150 MG capsu      4. Pacemaker  Adult Transthoracic " Echo Complete W/ Cont if Necessary Per Protocol    dabigatran etexilate (PRADAXA) 150 MG capsu      5. PAF (paroxysmal atrial fibrillation)  Adult Transthoracic Echo Complete W/ Cont if Necessary Per Protocol    dabigatran etexilate (PRADAXA) 150 MG capsu      6. SSS (sick sinus syndrome)  Adult Transthoracic Echo Complete W/ Cont if Necessary Per Protocol    dabigatran etexilate (PRADAXA) 150 MG capsu               Plan:       1.  CAD, status post CABG x 3 as outlined above, no angina pectoris, continue current medical therapy  2.  Paroxysmal atrial fibrillation in sinus rhythm, remains on sotalol 80 mg twice daily  3.  Chronic anticoagulation on warfarin, request transition to dabigatran I have sent in that prescription.  4.  Sick sinus syndrome, status post pacemaker in place, looks like the device was replaced in 2017.  I do not yet have that procedure note.  Follows in the device clinic.  5.  Hypertension, excellent control, continue same  6.  Mixed hyperlipidemia on lipid-lowering therapy with simvastatin, continue same.  7.  Mitral regurgitation-mild to moderate on most recent echocardiogram in 2022, continue to follow.  8.  Dyspnea with exertion, no overt evidence of volume overload, weight is stable, we will obtain an echocardiogram.  9. We discussed longitudinal aspects of care for CAD.    Thank you very much for allowing us to participate in the care of this pleasant patient.  Please don't hesitate to call if I can be of assistance in any way.      Current Outpatient Medications:     aspirin 81 MG EC tablet, Take 1 tablet by mouth Daily., Disp: , Rfl:     simvastatin (ZOCOR) 40 MG tablet, Take 1 tablet by mouth Every Night., Disp: , Rfl:     sotalol (BETAPACE) 80 MG tablet, Take 1 tablet by mouth 2 (Two) Times a Day., Disp: 180 tablet, Rfl: 3    tamsulosin (FLOMAX) 0.4 MG capsule 24 hr capsule, TAKE 1 CAPSULE DAILY, Disp: 30 capsule, Rfl: 11    dabigatran etexilate (PRADAXA) 150 MG capsu, Take 1 capsule  by mouth 2 (Two) Times a Day., Disp: 180 capsule, Rfl: 3         No follow-ups on file.

## 2025-04-07 ENCOUNTER — HOSPITAL ENCOUNTER (OUTPATIENT)
Dept: CARDIOLOGY | Facility: HOSPITAL | Age: 89
Discharge: HOME | End: 2025-04-07
Admitting: INTERNAL MEDICINE
Payer: MEDICARE

## 2025-04-07 VITALS
DIASTOLIC BLOOD PRESSURE: 90 MMHG | BODY MASS INDEX: 29.03 KG/M2 | HEART RATE: 98 BPM | SYSTOLIC BLOOD PRESSURE: 142 MMHG | WEIGHT: 185 LBS | HEIGHT: 67 IN

## 2025-04-07 DIAGNOSIS — E78.2 MIXED HYPERLIPIDEMIA: ICD-10-CM

## 2025-04-07 DIAGNOSIS — Z95.0 PACEMAKER: ICD-10-CM

## 2025-04-07 DIAGNOSIS — I25.810 CORONARY ARTERY DISEASE INVOLVING CORONARY BYPASS GRAFT OF NATIVE HEART WITHOUT ANGINA PECTORIS: ICD-10-CM

## 2025-04-07 DIAGNOSIS — I49.5 SSS (SICK SINUS SYNDROME): ICD-10-CM

## 2025-04-07 DIAGNOSIS — Z95.1 HX OF CABG: ICD-10-CM

## 2025-04-07 DIAGNOSIS — I48.0 PAF (PAROXYSMAL ATRIAL FIBRILLATION): ICD-10-CM

## 2025-04-07 LAB
AORTIC ARCH: 3 CM
AORTIC DIMENSIONLESS INDEX: 0.54 (DI)
ASCENDING AORTA: 3.4 CM
AV MEAN PRESS GRAD SYS DOP V1V2: 6 MMHG
AV VMAX SYS DOP: 150 CM/SEC
BH CV ECHO MEAS - ACS: 1.83 CM
BH CV ECHO MEAS - AO MAX PG: 9 MMHG
BH CV ECHO MEAS - AO ROOT DIAM: 3.5 CM
BH CV ECHO MEAS - AO V2 VTI: 37.7 CM
BH CV ECHO MEAS - AVA(I,D): 1.69 CM2
BH CV ECHO MEAS - EDV(CUBED): 68.7 ML
BH CV ECHO MEAS - EDV(MOD-SP2): 60 ML
BH CV ECHO MEAS - EDV(MOD-SP4): 60 ML
BH CV ECHO MEAS - EF(MOD-SP2): 56.7 %
BH CV ECHO MEAS - EF(MOD-SP4): 66.7 %
BH CV ECHO MEAS - ESV(CUBED): 29.8 ML
BH CV ECHO MEAS - ESV(MOD-SP2): 26 ML
BH CV ECHO MEAS - ESV(MOD-SP4): 20 ML
BH CV ECHO MEAS - FS: 24.3 %
BH CV ECHO MEAS - IVS/LVPW: 0.99 CM
BH CV ECHO MEAS - IVSD: 1.29 CM
BH CV ECHO MEAS - LAT PEAK E' VEL: 6 CM/SEC
BH CV ECHO MEAS - LV DIASTOLIC VOL/BSA (35-75): 30.7 CM2
BH CV ECHO MEAS - LV MASS(C)D: 192 GRAMS
BH CV ECHO MEAS - LV MAX PG: 3 MMHG
BH CV ECHO MEAS - LV MEAN PG: 2 MMHG
BH CV ECHO MEAS - LV SYSTOLIC VOL/BSA (12-30): 10.2 CM2
BH CV ECHO MEAS - LV V1 MAX: 87 CM/SEC
BH CV ECHO MEAS - LV V1 VTI: 20.3 CM
BH CV ECHO MEAS - LVIDD: 4.1 CM
BH CV ECHO MEAS - LVIDS: 3.1 CM
BH CV ECHO MEAS - LVOT AREA: 3.1 CM2
BH CV ECHO MEAS - LVOT DIAM: 2 CM
BH CV ECHO MEAS - LVPWD: 1.3 CM
BH CV ECHO MEAS - MED PEAK E' VEL: 5 CM/SEC
BH CV ECHO MEAS - MR MAX PG: 73.2 MMHG
BH CV ECHO MEAS - MR MAX VEL: 427.7 CM/SEC
BH CV ECHO MEAS - MV A DUR: 0.1 SEC
BH CV ECHO MEAS - MV A MAX VEL: 60.8 CM/SEC
BH CV ECHO MEAS - MV DEC SLOPE: 366.4 CM/SEC2
BH CV ECHO MEAS - MV DEC TIME: 0.17 SEC
BH CV ECHO MEAS - MV E MAX VEL: 99.4 CM/SEC
BH CV ECHO MEAS - MV E/A: 1.63
BH CV ECHO MEAS - MV MAX PG: 4.3 MMHG
BH CV ECHO MEAS - MV MEAN PG: 1.52 MMHG
BH CV ECHO MEAS - MV P1/2T: 80.6 MSEC
BH CV ECHO MEAS - MV V2 VTI: 27.7 CM
BH CV ECHO MEAS - MVA(P1/2T): 2.7 CM2
BH CV ECHO MEAS - MVA(VTI): 2.3 CM2
BH CV ECHO MEAS - PA ACC TIME: 0.12 SEC
BH CV ECHO MEAS - PA V2 MAX: 82.5 CM/SEC
BH CV ECHO MEAS - QP/QS: 0.74
BH CV ECHO MEAS - RAP SYSTOLE: 3 MMHG
BH CV ECHO MEAS - RV MAX PG: 0.69 MMHG
BH CV ECHO MEAS - RV V1 MAX: 41.6 CM/SEC
BH CV ECHO MEAS - RV V1 VTI: 8.8 CM
BH CV ECHO MEAS - RVOT DIAM: 2.6 CM
BH CV ECHO MEAS - RVSP: 57 MMHG
BH CV ECHO MEAS - SUP REN AO DIAM: 2.6 CM
BH CV ECHO MEAS - SV(LVOT): 63.6 ML
BH CV ECHO MEAS - SV(MOD-SP2): 34 ML
BH CV ECHO MEAS - SV(MOD-SP4): 40 ML
BH CV ECHO MEAS - SV(RVOT): 47 ML
BH CV ECHO MEAS - SVI(LVOT): 32.5 ML/M2
BH CV ECHO MEAS - SVI(MOD-SP2): 17.4 ML/M2
BH CV ECHO MEAS - SVI(MOD-SP4): 20.4 ML/M2
BH CV ECHO MEAS - TAPSE (>1.6): 1.78 CM
BH CV ECHO MEAS - TR MAX PG: 53.5 MMHG
BH CV ECHO MEAS - TR MAX VEL: 365.6 CM/SEC
BH CV ECHO MEASUREMENTS AVERAGE E/E' RATIO: 18.07
BH CV XLRA - RV BASE: 3.6 CM
BH CV XLRA - RV LENGTH: 6.6 CM
BH CV XLRA - RV MID: 2.8 CM
BH CV XLRA - TDI S': 10.2 CM/SEC
LEFT ATRIUM VOLUME INDEX: 52 ML/M2
LV EF BIPLANE MOD: 62.9 %
SINUS: 3.5 CM
STJ: 2.9 CM

## 2025-04-07 PROCEDURE — 93306 TTE W/DOPPLER COMPLETE: CPT

## 2025-04-07 PROCEDURE — 93306 TTE W/DOPPLER COMPLETE: CPT | Performed by: INTERNAL MEDICINE

## 2025-04-08 ENCOUNTER — RESULTS FOLLOW-UP (OUTPATIENT)
Dept: CARDIOLOGY | Age: 89
End: 2025-04-08
Payer: MEDICARE

## 2025-04-08 RX ORDER — FUROSEMIDE 20 MG/1
TABLET ORAL
Qty: 30 TABLET | Refills: 0 | Status: SHIPPED | OUTPATIENT
Start: 2025-04-08

## 2025-04-08 NOTE — PROGRESS NOTES
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      Patient was recently seen for shortness of breath.  He was taking furosemide 20 mg daily and was experiencing dizziness and low blood pressure.  I reviewed echo results.  He has grade 2 diastolic dysfunction, mild aortic stenosis, and severe pulmonary hypertension.    I discussed results with patient's son, Curly via phone.  Patient was present, but he is hard of hearing.  He drinks mostly coffee and sodas throughout the day.  I do not think he is well-hydrated.  I recommended more water and restarting furosemide 20 mg on Monday, Wednesday, and Fridays.  They will reassess for shortness of breath improves.  He snores at nighttime, but is never been diagnosed with sleep apnea.  I will consider a sleep study in the future if necessary.    We will call them in 2 weeks for an update.

## 2025-04-24 ENCOUNTER — TELEPHONE (OUTPATIENT)
Dept: CARDIOLOGY | Age: 89
End: 2025-04-24
Payer: MEDICARE

## 2025-04-24 NOTE — TELEPHONE ENCOUNTER
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  He was experiencing dizziness and low blood pressure.  He was drinking coffee and sodas throughout the day and I felt that he was dehydrated.  I recommended more water and taking the furosemide 20 mg on Monday, Wednesday, and Fridays.    Please call son Curly because patient is hard of hearing.  Please reassess his dizziness, hydration status, and see if his shortness of breath has improved on the furosemide.  Thank you

## 2025-04-25 NOTE — TELEPHONE ENCOUNTER
"Spoke to patient's son, Curly. He states that patient was doing better with dizziness until yesterday. His BP got to 100/50. He had him drink a lot of water yesterday and he is back to 120/70 today. I asked if he has increased his hydration status overall and he said \"probably not.\" Also, they have been giving the Lasix every other day not just on Mondays, Wednesdays, and Fridays. His SOA has improved.     Sara Narvaez RN  Triage [facility] [place]   "

## 2025-04-28 NOTE — TELEPHONE ENCOUNTER
Spoke to patient's son, Curly, allowed per ANTOINETTE. He did admit that when he spoke to me, he was under the impression that the patient was taking the Lasix every other day, but he has actually been taking it on Mondays, Wednesdays, and Fridays. I instructed them to continue doing so. He did say BP has improved since patient has increased his hydration.     Sara Narvaez RN  Triage [facility] [place]

## 2025-04-29 ENCOUNTER — TELEPHONE (OUTPATIENT)
Dept: CARDIOLOGY | Age: 89
End: 2025-04-29
Payer: MEDICARE

## 2025-04-29 NOTE — TELEPHONE ENCOUNTER
Cardiac clearance has been received from [facility],  office, requesting clearance for mutual patient to have a cystoscopy done 05/01/2025. They would like for patient to hold their dabigatran and aspirin for 7-10 days prior to procedure.    Return clearance to [phone]

## 2025-05-12 NOTE — TELEPHONE ENCOUNTER
Clearance form has been placed on your desk for you to review. Unable to find original clearance form from 04/29.

## 2025-05-12 NOTE — TELEPHONE ENCOUNTER
Cardiac clearance has been faxed and sent over to [facility] at [facility] at fax number [phone]. Fax was sent with success. MAY/12/2025 10:34:59 AM

## 2025-05-18 PROCEDURE — 93296 REM INTERROG EVL PM/IDS: CPT | Performed by: STUDENT IN AN ORGANIZED HEALTH CARE EDUCATION/TRAINING PROGRAM

## 2025-05-18 PROCEDURE — 93294 REM INTERROG EVL PM/LDLS PM: CPT | Performed by: STUDENT IN AN ORGANIZED HEALTH CARE EDUCATION/TRAINING PROGRAM

## 2025-05-30 RX ORDER — FUROSEMIDE 20 MG/1
TABLET ORAL SEE ADMIN INSTRUCTIONS
Qty: 30 TABLET | Refills: 4 | Status: SHIPPED | OUTPATIENT
Start: 2025-05-30

## 2025-05-30 NOTE — TELEPHONE ENCOUNTER
Upcoming Appts  With Cardiology ([facility] [place] [facility] DEVICE CHECK)  10/10/2025 at 10:00 AM    Last Office Visit - This Dept  3/25/2025 Juan Francisco Alexander III, MD    Assessment    Diagnosis Plan   1. Coronary artery disease involving coronary bypass graft of native heart without angina pectoris  Adult Transthoracic Echo Complete W/ Cont if Necessary Per Protocol     dabigatran etexilate (PRADAXA) 150 MG capsu       2. Hx of CABG  Adult Transthoracic Echo Complete W/ Cont if Necessary Per Protocol     dabigatran etexilate (PRADAXA) 150 MG capsu       3. Mixed hyperlipidemia  Adult Transthoracic Echo Complete W/ Cont if Necessary Per Protocol     dabigatran etexilate (PRADAXA) 150 MG capsu       4. Pacemaker  Adult Transthoracic Echo Complete W/ Cont if Necessary Per Protocol     dabigatran etexilate (PRADAXA) 150 MG capsu       5. PAF (paroxysmal atrial fibrillation)  Adult Transthoracic Echo Complete W/ Cont if Necessary Per Protocol     dabigatran etexilate (PRADAXA) 150 MG capsu       6. SSS (sick sinus syndrome)  Adult Transthoracic Echo Complete W/ Cont if Necessary Per Protocol     dabigatran etexilate (PRADAXA) 150 MG capsu                   Plan:      Plan   1.  CAD, status post CABG x 3 as outlined above, no angina pectoris, continue current medical therapy  2.  Paroxysmal atrial fibrillation in sinus rhythm, remains on sotalol 80 mg twice daily  3.  Chronic anticoagulation on warfarin, request transition to dabigatran I have sent in that prescription.  4.  Sick sinus syndrome, status post pacemaker in place, looks like the device was replaced in 2017.  I do not yet have that procedure note.  Follows in the device clinic.  5.  Hypertension, excellent control, continue same  6.  Mixed hyperlipidemia on lipid-lowering therapy with simvastatin, continue same.  7.  Mitral regurgitation-mild to moderate on most recent echocardiogram in 2022, continue to follow.  8.  Dyspnea with exertion, no overt evidence of volume  overload, weight is stable, we will obtain an echocardiogram.  9. We discussed longitudinal aspects of care for CAD.

## 2025-06-18 ENCOUNTER — TELEPHONE (OUTPATIENT)
Dept: FAMILY MEDICINE CLINIC | Facility: CLINIC | Age: 89
End: 2025-06-18
Payer: MEDICARE

## 2025-06-18 NOTE — TELEPHONE ENCOUNTER
Patient's son Curly called to schedule a lab appointment. Informed Curly we do not have lab orders for the patient. Curly then informed me that the patient was released from the hospital yesterday, and the hospital told him that they were sending lab orders to [facility]. Advised Curly to scheduled a hospital follow up for the patient and Curly declined stating he just want to schedule labs.

## 2025-06-23 LAB
MDC_IDC_MSMT_BATTERY_REMAINING_PERCENTAGE: 45 %
MDC_IDC_MSMT_BATTERY_STATUS: NORMAL
MDC_IDC_MSMT_LEADCHNL_RA_DTM: NORMAL
MDC_IDC_MSMT_LEADCHNL_RA_IMPEDANCE_VALUE: 527
MDC_IDC_MSMT_LEADCHNL_RA_SENSING_INTR_AMPL: 3.9
MDC_IDC_MSMT_LEADCHNL_RV_DTM: NORMAL
MDC_IDC_MSMT_LEADCHNL_RV_IMPEDANCE_VALUE: 566
MDC_IDC_MSMT_LEADCHNL_RV_SENSING_INTR_AMPL: 11.1
MDC_IDC_PG_IMPLANT_DTM: NORMAL
MDC_IDC_PG_MFG: NORMAL
MDC_IDC_PG_MODEL: NORMAL
MDC_IDC_PG_SERIAL: NORMAL
MDC_IDC_PG_TYPE: NORMAL
MDC_IDC_SESS_DTM: NORMAL
MDC_IDC_SESS_TYPE: NORMAL
MDC_IDC_SET_BRADY_AT_MODE_SWITCH_RATE: 160
MDC_IDC_SET_BRADY_LOWRATE: 65
MDC_IDC_SET_BRADY_MAX_SENSOR_RATE: 120
MDC_IDC_SET_BRADY_MAX_TRACKING_RATE: 130
MDC_IDC_SET_BRADY_MODE: NORMAL
MDC_IDC_SET_BRADY_PAV_DELAY: 220
MDC_IDC_SET_BRADY_SAV_DELAY: 220
MDC_IDC_SET_LEADCHNL_RA_PACING_AMPLITUDE: 2
MDC_IDC_SET_LEADCHNL_RA_PACING_POLARITY: NORMAL
MDC_IDC_SET_LEADCHNL_RA_PACING_PULSEWIDTH: 0.4
MDC_IDC_SET_LEADCHNL_RA_SENSING_POLARITY: NORMAL
MDC_IDC_SET_LEADCHNL_RV_PACING_AMPLITUDE: 2.4
MDC_IDC_SET_LEADCHNL_RV_PACING_POLARITY: NORMAL
MDC_IDC_SET_LEADCHNL_RV_PACING_PULSEWIDTH: 0.4
MDC_IDC_SET_LEADCHNL_RV_SENSING_POLARITY: NORMAL
MDC_IDC_STAT_AT_BURDEN_PERCENT: 0
MDC_IDC_STAT_BRADY_RA_PERCENT_PACED: 95
MDC_IDC_STAT_BRADY_RV_PERCENT_PACED: 9

## 2025-06-27 ENCOUNTER — OFFICE VISIT (OUTPATIENT)
Dept: FAMILY MEDICINE CLINIC | Facility: CLINIC | Age: 89
End: 2025-06-27
Payer: MEDICARE

## 2025-06-27 VITALS
OXYGEN SATURATION: 97 % | WEIGHT: 175.4 LBS | HEART RATE: 84 BPM | DIASTOLIC BLOOD PRESSURE: 60 MMHG | HEIGHT: 67 IN | SYSTOLIC BLOOD PRESSURE: 110 MMHG | BODY MASS INDEX: 27.53 KG/M2 | TEMPERATURE: 97.6 F

## 2025-06-27 DIAGNOSIS — N28.89 RENAL MASS: ICD-10-CM

## 2025-06-27 DIAGNOSIS — N32.0 BLADDER NECK CONTRACTURE: ICD-10-CM

## 2025-06-27 DIAGNOSIS — Z09 HOSPITAL DISCHARGE FOLLOW-UP: Primary | ICD-10-CM

## 2025-06-27 DIAGNOSIS — R31.9 URINARY TRACT INFECTION WITH HEMATURIA, SITE UNSPECIFIED: ICD-10-CM

## 2025-06-27 DIAGNOSIS — N39.0 URINARY TRACT INFECTION WITH HEMATURIA, SITE UNSPECIFIED: ICD-10-CM

## 2025-06-27 DIAGNOSIS — R73.01 IFG (IMPAIRED FASTING GLUCOSE): ICD-10-CM

## 2025-06-27 DIAGNOSIS — R63.0 DECREASED APPETITE: ICD-10-CM

## 2025-06-27 DIAGNOSIS — N18.31 STAGE 3A CHRONIC KIDNEY DISEASE: ICD-10-CM

## 2025-06-27 DIAGNOSIS — R35.0 URINARY FREQUENCY: ICD-10-CM

## 2025-06-27 LAB
BILIRUB BLD-MCNC: NEGATIVE MG/DL
CLARITY, POC: ABNORMAL
COLOR UR: ABNORMAL
EXPIRATION DATE: ABNORMAL
GLUCOSE UR STRIP-MCNC: NEGATIVE MG/DL
KETONES UR QL: ABNORMAL
LEUKOCYTE EST, POC: ABNORMAL
Lab: ABNORMAL
NITRITE UR-MCNC: POSITIVE MG/ML
PH UR: 6 [PH] (ref 5–8)
PROT UR STRIP-MCNC: ABNORMAL MG/DL
RBC # UR STRIP: ABNORMAL /UL
SP GR UR: 1.02 (ref 1–1.03)
UROBILINOGEN UR QL: NORMAL

## 2025-06-27 RX ORDER — SULFAMETHOXAZOLE AND TRIMETHOPRIM 400; 80 MG/1; MG/1
1 TABLET ORAL 2 TIMES DAILY
Qty: 10 TABLET | Refills: 0 | Status: SHIPPED | OUTPATIENT
Start: 2025-06-27 | End: 2025-07-02

## 2025-06-27 NOTE — PROGRESS NOTES
Chief Complaint  Anorexia    Subjective          Anorexia  Symptoms: no abdominal pain, no chest pain, no chills, no congestion, no cough, no fatigue, no fever, no myalgias, no nausea, no neck pain, no rash, no dysuria, no vomiting and no weakness         Lcuiano Tate 89 y.o. male presents for evaluation of decreased appetite.    Mr. Tate has been experiencing a lack of appetite for the past 3 to 4 days, which coincides with his hospital stay for a ureteral stent procedure. Despite being discharged, he continued to struggle with the stent, leading to its early removal on 06/24/2025. Post-procedure, he attempted to resume normal activities such as working on his car but was advised to rest. A day or two later, he began to feel unwell and lost his appetite. He has been consuming minimal food, with watermelon being the only item he ate yesterday. His appetite has always been modest, but it has significantly decreased since the procedure. He reports no fever, chills, or body aches. He also reports no chest pain but mentions a sensation of a knot in his stomach when belching. He is having intermittent loose stools. He reports no nausea or vomiting. He has not tried Ensure supplements and is unsure about consulting a dietitian. He has been making an effort to increase his fluid intake.    The patient has been followed by Urology. He underwent left ureteroscopy, left ureteral stent placement and Alvarado catheter insertion on 6/16/2025. Both ureteral stent and Alvarado catheter were removed on 6/20/2025.     He has been experiencing frequent belching, which is unusual for him, but does not report any symptoms of acid reflux or indigestion.    He has chronic kidney disease and saw a Nephrologist on 06/25/2025. He thought he might have a UTI, which was evaluated by the Nephrologist two days ago. He reports no pain or burning during urination. The Nephrologist wanted to check if his bladder was fully emptying because he has had  "issues with frequent urination and controlling it. It is a little better now, but he is having some urinary incontinence. He is urinating frequently.    He underwent a biopsy due to renal mass. See the following findings:  LEFT RENAL PELVIS URINE:               NEGATIVE FOR HIGH GRADE UROTHELIAL CARCINOMA.               SATISFACTORY FOR EVALUATION.    PAST SURGICAL HISTORY:  Ureteral stent placement and removal on 06/24/2025.      Review of Systems   Constitutional:  Positive for appetite change. Negative for chills, fatigue and fever.   HENT:  Negative for congestion, sinus pressure and trouble swallowing.    Eyes:  Negative for visual disturbance.   Respiratory:  Negative for cough, chest tightness, shortness of breath and wheezing.    Cardiovascular:  Negative for chest pain, palpitations and leg swelling.   Gastrointestinal:  Negative for abdominal pain, constipation, diarrhea, nausea and vomiting.        Belching   Genitourinary:  Positive for frequency. Negative for decreased urine volume, difficulty urinating, dysuria, flank pain and hematuria.   Musculoskeletal:  Negative for gait problem, myalgias and neck pain.   Skin:  Negative for rash.   Neurological:  Negative for dizziness, syncope, weakness and light-headedness.   Psychiatric/Behavioral:  Negative for dysphoric mood. The patient is not nervous/anxious.         Objective   Vital Signs:   /60 (BP Location: Left arm, Patient Position: Sitting, Cuff Size: Adult)   Pulse 84   Temp 97.6 °F (36.4 °C) (Oral)   Ht 170.2 cm (67\")   Wt 79.6 kg (175 lb 6.4 oz)   SpO2 97%   BMI 27.47 kg/m²      BMI is >= 25 and <30. (Overweight) The following options were offered after discussion;: exercise counseling/recommendations and nutrition counseling/recommendations       Physical Exam  Vitals and nursing note reviewed.   Constitutional:       General: He is not in acute distress.     Appearance: He is well-developed and overweight. He is not ill-appearing. "   HENT:      Head: Normocephalic and atraumatic.      Right Ear: Decreased hearing noted.      Left Ear: Decreased hearing noted.      Ears:      Comments: Difficulty hearing. Bilateral hearing aides are in place.   Eyes:      General: No scleral icterus.        Right eye: No discharge.         Left eye: No discharge.      Conjunctiva/sclera: Conjunctivae normal.      Pupils: Pupils are equal, round, and reactive to light.   Neck:      Thyroid: No thyromegaly.      Trachea: No tracheal deviation.   Cardiovascular:      Rate and Rhythm: Normal rate and regular rhythm.      Pulses: Normal pulses.      Heart sounds: Normal heart sounds.   Pulmonary:      Effort: Pulmonary effort is normal. No respiratory distress.      Breath sounds: Normal breath sounds. No wheezing or rales.   Abdominal:      General: Bowel sounds are normal.      Palpations: Abdomen is soft. There is no mass.      Tenderness: There is no abdominal tenderness. There is no guarding or rebound.   Musculoskeletal:         General: Normal range of motion.      Cervical back: Normal range of motion and neck supple.   Skin:     General: Skin is warm.      Coloration: Skin is not pale.      Findings: No erythema or rash.   Neurological:      Mental Status: He is alert and oriented to person, place, and time.      Motor: No abnormal muscle tone.   Psychiatric:         Mood and Affect: Mood normal.           Respiratory: Clear to auscultation, no wheezing, rales or rhonchi  Cardiovascular: Regular rate and rhythm, no murmurs, rubs, or gallops       The following data was reviewed by: ROCÍO Luz on 06/27/2025:  BASIC METABOLIC PANEL (06/17/2025 01:23)   CBC AND DIFFERENTIAL (06/17/2025 01:23)   Cytology Pre-op diagnosis:Renal mass [N28.89], Bladder neck contracture [N32.0] (06/16/2025 08:44)     Creatinine clearance: 42.937 mL/min.    Results                 Assessment and Plan       1. Decreased appetite.  - Decreased appetite noted, possibly due to  chronic kidney disease.  - Physical exam reveals clear lungs; no fever, chills, body aches, chest pain, nausea, or vomiting.  - Labs ordered to check thyroid function, white blood cell count, and kidney function.  - Advised to increase calori intake by consuming three small meals daily. Also, increase fluid intake and try Glucerna supplemental drinks.    2. Indigestion.  - Reports frequent belching and indigestion without acid reflux.  - No significant findings on physical exam related to indigestion.  - Symptoms discussed; advised to monitor and report any worsening or new symptoms.  - Avoid spicy food, large meals, alcohol, and carbonated drinks.    3. Chronic kidney disease.  - Recently seen by Nephrologist.  - Physical exam reveals no pain or burning during urination.  - Advised to continue follow-up with Nephrologist, especially if urination patterns change or decreased appetite persists.  - Avoid all NSAID's.           Hospital discharge follow-up    Orders:    sulfamethoxazole-trimethoprim (Bactrim) 400-80 MG tablet; Take 1 tablet by mouth 2 (Two) Times a Day for 5 days.    Urine Culture - Urine, Urine, Clean Catch    Renal mass  Follow up with Urology.       Bladder neck contracture  Follow up with Urology.       Stage 3a chronic kidney disease           Urinary tract infection with hematuria, site unspecified    Orders:    sulfamethoxazole-trimethoprim (Bactrim) 400-80 MG tablet; Take 1 tablet by mouth 2 (Two) Times a Day for 5 days.    Urine Culture - Urine, Urine, Clean Catch    Decreased appetite    Orders:    Comprehensive metabolic panel    CBC and Differential    TSH    Hemoglobin A1c    Urine Culture - Urine, Urine, Clean Catch    DME ORAL SUPPLEMENTS    IFG (impaired fasting glucose)    Orders:    Comprehensive metabolic panel    Hemoglobin A1c    Urinary frequency    Orders:    POCT urinalysis dipstick, automated             Follow Up     Return if symptoms worsen or fail to improve.    Patient or  patient representative verbalized consent for the use of Ambient Listening during the visit with  ROCÍO Luz for chart documentation. 6/27/2025  10:21 EDT    Patient was given instructions and counseling regarding his condition or for health maintenance advice. Please see specific information pulled into the AVS if appropriate.

## 2025-06-28 LAB
ALBUMIN SERPL-MCNC: 3.3 G/DL (ref 3.5–5.2)
ALBUMIN/GLOB SERPL: 1.3 G/DL
ALP SERPL-CCNC: 68 U/L (ref 39–117)
ALT SERPL-CCNC: 127 U/L (ref 1–41)
AST SERPL-CCNC: 154 U/L (ref 1–40)
BASOPHILS # BLD AUTO: 0.05 10*3/MM3 (ref 0–0.2)
BASOPHILS NFR BLD AUTO: 0.8 % (ref 0–1.5)
BILIRUB SERPL-MCNC: 0.5 MG/DL (ref 0–1.2)
BUN SERPL-MCNC: 17 MG/DL (ref 8–23)
BUN/CREAT SERPL: 13.8 (ref 7–25)
CALCIUM SERPL-MCNC: 8.8 MG/DL (ref 8.6–10.5)
CHLORIDE SERPL-SCNC: 99 MMOL/L (ref 98–107)
CO2 SERPL-SCNC: 27 MMOL/L (ref 22–29)
CREAT SERPL-MCNC: 1.23 MG/DL (ref 0.76–1.27)
EGFRCR SERPLBLD CKD-EPI 2021: 56.1 ML/MIN/1.73
EOSINOPHIL # BLD AUTO: 0.08 10*3/MM3 (ref 0–0.4)
EOSINOPHIL NFR BLD AUTO: 1.3 % (ref 0.3–6.2)
ERYTHROCYTE [DISTWIDTH] IN BLOOD BY AUTOMATED COUNT: 11.9 % (ref 12.3–15.4)
GLOBULIN SER CALC-MCNC: 2.6 GM/DL
GLUCOSE SERPL-MCNC: 84 MG/DL (ref 65–99)
HBA1C MFR BLD: 5.5 % (ref 4.8–5.6)
HCT VFR BLD AUTO: 37.4 % (ref 37.5–51)
HGB BLD-MCNC: 12.3 G/DL (ref 13–17.7)
IMM GRANULOCYTES # BLD AUTO: 0.02 10*3/MM3 (ref 0–0.05)
IMM GRANULOCYTES NFR BLD AUTO: 0.3 % (ref 0–0.5)
LYMPHOCYTES # BLD AUTO: 0.87 10*3/MM3 (ref 0.7–3.1)
LYMPHOCYTES NFR BLD AUTO: 13.7 % (ref 19.6–45.3)
MCH RBC QN AUTO: 30.1 PG (ref 26.6–33)
MCHC RBC AUTO-ENTMCNC: 32.9 G/DL (ref 31.5–35.7)
MCV RBC AUTO: 91.4 FL (ref 79–97)
MONOCYTES # BLD AUTO: 0.61 10*3/MM3 (ref 0.1–0.9)
MONOCYTES NFR BLD AUTO: 9.6 % (ref 5–12)
NEUTROPHILS # BLD AUTO: 4.73 10*3/MM3 (ref 1.7–7)
NEUTROPHILS NFR BLD AUTO: 74.3 % (ref 42.7–76)
NRBC BLD AUTO-RTO: 0 /100 WBC (ref 0–0.2)
PLATELET # BLD AUTO: 188 10*3/MM3 (ref 140–450)
POTASSIUM SERPL-SCNC: 4.4 MMOL/L (ref 3.5–5.2)
PROT SERPL-MCNC: 5.9 G/DL (ref 6–8.5)
RBC # BLD AUTO: 4.09 10*6/MM3 (ref 4.14–5.8)
SODIUM SERPL-SCNC: 137 MMOL/L (ref 136–145)
TSH SERPL DL<=0.005 MIU/L-ACNC: 3.06 UIU/ML (ref 0.27–4.2)
WBC # BLD AUTO: 6.36 10*3/MM3 (ref 3.4–10.8)

## 2025-07-02 LAB
BACTERIA UR CULT: ABNORMAL
BACTERIA UR CULT: ABNORMAL
OTHER ANTIBIOTIC SUSC ISLT: ABNORMAL

## 2025-07-06 DIAGNOSIS — R74.8 ELEVATED LIVER ENZYMES: Primary | ICD-10-CM

## 2025-07-25 LAB
MDC_IDC_MSMT_BATTERY_REMAINING_PERCENTAGE: 45 %
MDC_IDC_MSMT_BATTERY_STATUS: NORMAL
MDC_IDC_MSMT_LEADCHNL_RA_DTM: NORMAL
MDC_IDC_MSMT_LEADCHNL_RA_IMPEDANCE_VALUE: 488
MDC_IDC_MSMT_LEADCHNL_RA_SENSING_INTR_AMPL: 3.6
MDC_IDC_MSMT_LEADCHNL_RV_DTM: NORMAL
MDC_IDC_MSMT_LEADCHNL_RV_IMPEDANCE_VALUE: 488
MDC_IDC_MSMT_LEADCHNL_RV_SENSING_INTR_AMPL: 9.4
MDC_IDC_PG_IMPLANT_DTM: NORMAL
MDC_IDC_PG_MFG: NORMAL
MDC_IDC_PG_MODEL: NORMAL
MDC_IDC_PG_SERIAL: NORMAL
MDC_IDC_PG_TYPE: NORMAL
MDC_IDC_SESS_DTM: NORMAL
MDC_IDC_SESS_TYPE: NORMAL
MDC_IDC_SET_BRADY_AT_MODE_SWITCH_RATE: 160
MDC_IDC_SET_BRADY_LOWRATE: 65
MDC_IDC_SET_BRADY_MAX_SENSOR_RATE: 120
MDC_IDC_SET_BRADY_MAX_TRACKING_RATE: 130
MDC_IDC_SET_BRADY_MODE: NORMAL
MDC_IDC_SET_BRADY_PAV_DELAY: 220
MDC_IDC_SET_BRADY_SAV_DELAY: 220
MDC_IDC_SET_LEADCHNL_RA_PACING_AMPLITUDE: 2
MDC_IDC_SET_LEADCHNL_RA_PACING_POLARITY: NORMAL
MDC_IDC_SET_LEADCHNL_RA_PACING_PULSEWIDTH: 0.4
MDC_IDC_SET_LEADCHNL_RA_SENSING_POLARITY: NORMAL
MDC_IDC_SET_LEADCHNL_RV_PACING_AMPLITUDE: 2.4
MDC_IDC_SET_LEADCHNL_RV_PACING_POLARITY: NORMAL
MDC_IDC_SET_LEADCHNL_RV_PACING_PULSEWIDTH: 0.4
MDC_IDC_SET_LEADCHNL_RV_SENSING_POLARITY: NORMAL
MDC_IDC_STAT_AT_BURDEN_PERCENT: 0
MDC_IDC_STAT_BRADY_RA_PERCENT_PACED: 95
MDC_IDC_STAT_BRADY_RV_PERCENT_PACED: 8

## 2025-07-28 ENCOUNTER — HOSPITAL ENCOUNTER (OUTPATIENT)
Facility: HOSPITAL | Age: 89
Discharge: HOME | End: 2025-07-28
Payer: MEDICARE

## 2025-07-28 ENCOUNTER — ANTICOAGULATION VISIT (OUTPATIENT)
Dept: PHARMACY | Facility: HOSPITAL | Age: 89
End: 2025-07-28
Payer: MEDICARE

## 2025-07-28 DIAGNOSIS — I48.0 PAF (PAROXYSMAL ATRIAL FIBRILLATION): Primary | ICD-10-CM

## 2025-07-28 DIAGNOSIS — R74.8 ELEVATED LIVER ENZYMES: ICD-10-CM

## 2025-07-28 PROCEDURE — 76705 ECHO EXAM OF ABDOMEN: CPT

## 2025-07-28 NOTE — PROGRESS NOTES
This visit is for documentation purposes only: Mr. Tate's warfarin has been discontinued and replaced with dabigatran. May refer to 3/25/25 Cardiology Office Visit. No longer following in the [facility]. It was a pleasure being part of his care.

## 2025-08-10 DIAGNOSIS — N28.1 RENAL CYST: Primary | ICD-10-CM

## 2025-08-20 LAB
MDC_IDC_MSMT_BATTERY_REMAINING_PERCENTAGE: 45 %
MDC_IDC_MSMT_BATTERY_STATUS: NORMAL
MDC_IDC_MSMT_LEADCHNL_RA_DTM: NORMAL
MDC_IDC_MSMT_LEADCHNL_RA_IMPEDANCE_VALUE: 468
MDC_IDC_MSMT_LEADCHNL_RA_SENSING_INTR_AMPL: 3.7
MDC_IDC_MSMT_LEADCHNL_RV_DTM: NORMAL
MDC_IDC_MSMT_LEADCHNL_RV_IMPEDANCE_VALUE: 488
MDC_IDC_MSMT_LEADCHNL_RV_SENSING_INTR_AMPL: 10.1
MDC_IDC_PG_IMPLANT_DTM: NORMAL
MDC_IDC_PG_MFG: NORMAL
MDC_IDC_PG_MODEL: NORMAL
MDC_IDC_PG_SERIAL: NORMAL
MDC_IDC_PG_TYPE: NORMAL
MDC_IDC_SESS_DTM: NORMAL
MDC_IDC_SESS_TYPE: NORMAL
MDC_IDC_SET_BRADY_AT_MODE_SWITCH_RATE: 160
MDC_IDC_SET_BRADY_LOWRATE: 65
MDC_IDC_SET_BRADY_MAX_SENSOR_RATE: 120
MDC_IDC_SET_BRADY_MAX_TRACKING_RATE: 130
MDC_IDC_SET_BRADY_MODE: NORMAL
MDC_IDC_SET_BRADY_PAV_DELAY: 220
MDC_IDC_SET_BRADY_SAV_DELAY: 220
MDC_IDC_SET_LEADCHNL_RA_PACING_AMPLITUDE: 2
MDC_IDC_SET_LEADCHNL_RA_PACING_POLARITY: NORMAL
MDC_IDC_SET_LEADCHNL_RA_PACING_PULSEWIDTH: 0.4
MDC_IDC_SET_LEADCHNL_RA_SENSING_POLARITY: NORMAL
MDC_IDC_SET_LEADCHNL_RV_PACING_AMPLITUDE: 2.4
MDC_IDC_SET_LEADCHNL_RV_PACING_POLARITY: NORMAL
MDC_IDC_SET_LEADCHNL_RV_PACING_PULSEWIDTH: 0.4
MDC_IDC_SET_LEADCHNL_RV_SENSING_POLARITY: NORMAL
MDC_IDC_STAT_AT_BURDEN_PERCENT: 0
MDC_IDC_STAT_BRADY_RA_PERCENT_PACED: 95
MDC_IDC_STAT_BRADY_RV_PERCENT_PACED: 8

## 2025-08-28 ENCOUNTER — OFFICE VISIT (OUTPATIENT)
Dept: GASTROENTEROLOGY | Facility: CLINIC | Age: 89
End: 2025-08-28
Payer: MEDICARE

## 2025-08-28 VITALS
DIASTOLIC BLOOD PRESSURE: 84 MMHG | BODY MASS INDEX: 27.65 KG/M2 | HEART RATE: 71 BPM | WEIGHT: 176.2 LBS | TEMPERATURE: 97.1 F | HEIGHT: 67 IN | SYSTOLIC BLOOD PRESSURE: 173 MMHG

## 2025-08-28 DIAGNOSIS — R79.89 ELEVATED LIVER FUNCTION TESTS: Primary | ICD-10-CM

## 2025-08-28 PROCEDURE — 1159F MED LIST DOCD IN RCRD: CPT | Performed by: PHYSICIAN ASSISTANT

## 2025-08-28 PROCEDURE — 1160F RVW MEDS BY RX/DR IN RCRD: CPT | Performed by: PHYSICIAN ASSISTANT

## 2025-08-28 PROCEDURE — 99203 OFFICE O/P NEW LOW 30 MIN: CPT | Performed by: PHYSICIAN ASSISTANT

## 2025-08-29 LAB
ALBUMIN SERPL-MCNC: 3.7 G/DL (ref 3.5–5.2)
ALP SERPL-CCNC: 70 U/L (ref 39–117)
ALT SERPL-CCNC: 12 U/L (ref 1–41)
AST SERPL-CCNC: 18 U/L (ref 1–40)
BILIRUB DIRECT SERPL-MCNC: 0.2 MG/DL (ref 0–0.3)
BILIRUB SERPL-MCNC: 0.6 MG/DL (ref 0–1.2)
PROT SERPL-MCNC: 5.9 G/DL (ref 6–8.5)